# Patient Record
Sex: MALE | Race: WHITE | NOT HISPANIC OR LATINO | Employment: OTHER | URBAN - METROPOLITAN AREA
[De-identification: names, ages, dates, MRNs, and addresses within clinical notes are randomized per-mention and may not be internally consistent; named-entity substitution may affect disease eponyms.]

---

## 2015-10-15 LAB — HM COLONOSCOPY: NORMAL

## 2017-01-31 ENCOUNTER — ALLSCRIPTS OFFICE VISIT (OUTPATIENT)
Dept: OTHER | Facility: OTHER | Age: 77
End: 2017-01-31

## 2017-02-13 ENCOUNTER — GENERIC CONVERSION - ENCOUNTER (OUTPATIENT)
Dept: OTHER | Facility: OTHER | Age: 77
End: 2017-02-13

## 2017-03-12 ENCOUNTER — APPOINTMENT (EMERGENCY)
Dept: CT IMAGING | Facility: HOSPITAL | Age: 77
End: 2017-03-12
Payer: COMMERCIAL

## 2017-03-12 ENCOUNTER — APPOINTMENT (EMERGENCY)
Dept: RADIOLOGY | Facility: HOSPITAL | Age: 77
End: 2017-03-12
Payer: COMMERCIAL

## 2017-03-12 ENCOUNTER — HOSPITAL ENCOUNTER (INPATIENT)
Facility: HOSPITAL | Age: 77
LOS: 2 days | Discharge: HOME | DRG: 086 | End: 2017-03-15
Attending: SURGERY | Admitting: SURGERY
Payer: MEDICARE

## 2017-03-12 ENCOUNTER — HOSPITAL ENCOUNTER (EMERGENCY)
Facility: HOSPITAL | Age: 77
End: 2017-03-12
Attending: EMERGENCY MEDICINE | Admitting: EMERGENCY MEDICINE
Payer: COMMERCIAL

## 2017-03-12 VITALS
SYSTOLIC BLOOD PRESSURE: 148 MMHG | BODY MASS INDEX: 26.34 KG/M2 | WEIGHT: 184 LBS | DIASTOLIC BLOOD PRESSURE: 76 MMHG | HEIGHT: 70 IN | TEMPERATURE: 96.6 F | OXYGEN SATURATION: 95 % | RESPIRATION RATE: 16 BRPM | HEART RATE: 78 BPM

## 2017-03-12 DIAGNOSIS — S09.90XA HEAD TRAUMA, INITIAL ENCOUNTER: Primary | ICD-10-CM

## 2017-03-12 DIAGNOSIS — I60.9 SUBARACHNOID HEMORRHAGE (HCC): ICD-10-CM

## 2017-03-12 DIAGNOSIS — S02.2XXA CLOSED FRACTURE OF NASAL BONE, INITIAL ENCOUNTER: ICD-10-CM
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
## 2017-03-12 DIAGNOSIS — H53.8 BLURRING OF VISION: ICD-10-CM

## 2017-03-12 DIAGNOSIS — S02.2XXA: ICD-10-CM

## 2017-03-12 DIAGNOSIS — I60.9 SUBARACHNOID HEMORRHAGE (HCC): Primary | ICD-10-CM

## 2017-03-12 LAB
ANION GAP SERPL CALCULATED.3IONS-SCNC: 13 MMOL/L (ref 4–13)
APTT PPP: 43 SECONDS (ref 24–36)
BASOPHILS # BLD AUTO: 0.02 THOUSANDS/ΜL (ref 0–0.1)
BASOPHILS NFR BLD AUTO: 0 % (ref 0–1)
BUN SERPL-MCNC: 16 MG/DL (ref 5–25)
CALCIUM SERPL-MCNC: 8.8 MG/DL (ref 8.3–10.1)
CHLORIDE SERPL-SCNC: 106 MMOL/L (ref 100–108)
CO2 SERPL-SCNC: 24 MMOL/L (ref 21–32)
CREAT SERPL-MCNC: 0.97 MG/DL (ref 0.6–1.3)
EOSINOPHIL # BLD AUTO: 0.21 THOUSAND/ΜL (ref 0–0.61)
EOSINOPHIL NFR BLD AUTO: 3 % (ref 0–6)
ERYTHROCYTE [DISTWIDTH] IN BLOOD BY AUTOMATED COUNT: 14.1 % (ref 11.6–15.1)
GFR SERPL CREATININE-BSD FRML MDRD: >60 ML/MIN/1.73SQ M
GLUCOSE SERPL-MCNC: 68 MG/DL (ref 65–140)
HCT VFR BLD AUTO: 42.9 % (ref 36.5–49.3)
HGB BLD-MCNC: 14.5 G/DL (ref 12–17)
INR PPP: 2.84 (ref 0.86–1.16)
LYMPHOCYTES # BLD AUTO: 1.12 THOUSANDS/ΜL (ref 0.6–4.47)
LYMPHOCYTES NFR BLD AUTO: 15 % (ref 14–44)
MCH RBC QN AUTO: 32.8 PG (ref 26.8–34.3)
MCHC RBC AUTO-ENTMCNC: 33.8 G/DL (ref 31.4–37.4)
MCV RBC AUTO: 97 FL (ref 82–98)
MONOCYTES # BLD AUTO: 0.73 THOUSAND/ΜL (ref 0.17–1.22)
MONOCYTES NFR BLD AUTO: 10 % (ref 4–12)
NEUTROPHILS # BLD AUTO: 5.19 THOUSANDS/ΜL (ref 1.85–7.62)
NEUTS SEG NFR BLD AUTO: 72 % (ref 43–75)
PLATELET # BLD AUTO: 198 THOUSANDS/UL (ref 149–390)
PMV BLD AUTO: 9.7 FL (ref 8.9–12.7)
POTASSIUM SERPL-SCNC: 3.8 MMOL/L (ref 3.5–5.3)
PROTHROMBIN TIME: 28.9 SECONDS (ref 12–14.3)
RBC # BLD AUTO: 4.42 MILLION/UL (ref 3.88–5.62)
SODIUM SERPL-SCNC: 143 MMOL/L (ref 136–145)
WBC # BLD AUTO: 7.27 THOUSAND/UL (ref 4.31–10.16)

## 2017-03-12 PROCEDURE — 99285 EMERGENCY DEPT VISIT HI MDM: CPT

## 2017-03-12 PROCEDURE — 70450 CT HEAD/BRAIN W/O DYE: CPT

## 2017-03-12 PROCEDURE — 85025 COMPLETE CBC W/AUTO DIFF WBC: CPT | Performed by: EMERGENCY MEDICINE

## 2017-03-12 PROCEDURE — 70486 CT MAXILLOFACIAL W/O DYE: CPT

## 2017-03-12 PROCEDURE — 80048 BASIC METABOLIC PNL TOTAL CA: CPT | Performed by: EMERGENCY MEDICINE

## 2017-03-12 PROCEDURE — 93005 ELECTROCARDIOGRAM TRACING: CPT

## 2017-03-12 PROCEDURE — 73080 X-RAY EXAM OF ELBOW: CPT

## 2017-03-12 PROCEDURE — 85610 PROTHROMBIN TIME: CPT | Performed by: PHYSICIAN ASSISTANT

## 2017-03-12 PROCEDURE — 96372 THER/PROPH/DIAG INJ SC/IM: CPT

## 2017-03-12 PROCEDURE — 85730 THROMBOPLASTIN TIME PARTIAL: CPT | Performed by: PHYSICIAN ASSISTANT

## 2017-03-12 PROCEDURE — 72125 CT NECK SPINE W/O DYE: CPT

## 2017-03-12 PROCEDURE — 36415 COLL VENOUS BLD VENIPUNCTURE: CPT | Performed by: PHYSICIAN ASSISTANT

## 2017-03-12 RX ORDER — METOPROLOL SUCCINATE 25 MG/1
25 TABLET, EXTENDED RELEASE ORAL DAILY
COMMUNITY
End: 2018-08-20

## 2017-03-12 RX ORDER — GLIMEPIRIDE 2 MG/1
TABLET ORAL
COMMUNITY
Start: 2011-08-31 | End: 2018-02-27

## 2017-03-12 RX ORDER — PHYTONADIONE 10 MG/ML
5 INJECTION, EMULSION INTRAMUSCULAR; INTRAVENOUS; SUBCUTANEOUS ONCE
Status: COMPLETED | OUTPATIENT
Start: 2017-03-12 | End: 2017-03-12

## 2017-03-12 RX ORDER — TADALAFIL 5 MG/1
TABLET ORAL
COMMUNITY
Start: 2015-02-02 | End: 2017-03-12

## 2017-03-12 RX ORDER — WARFARIN SODIUM 5 MG/1
TABLET ORAL
COMMUNITY
Start: 2012-11-19 | End: 2017-03-15 | Stop reason: HOSPADM

## 2017-03-12 RX ADMIN — PHYTONADIONE 5 MG: 10 INJECTION, EMULSION INTRAMUSCULAR; INTRAVENOUS; SUBCUTANEOUS at 22:57

## 2017-03-13 PROBLEM — S00.03XA HEMATOMA OF FRONTAL SCALP: Status: ACTIVE | Noted: 2017-03-13

## 2017-03-13 PROBLEM — H05.239 PERIORBITAL HEMATOMA: Status: ACTIVE | Noted: 2017-03-13

## 2017-03-13 PROBLEM — I48.91 A-FIB (HCC): Chronic | Status: ACTIVE | Noted: 2017-03-13

## 2017-03-13 PROBLEM — S02.2XXA CLOSED NONDISPLACED FRACTURE OF NASAL BONE: Status: ACTIVE | Noted: 2017-03-13

## 2017-03-13 PROBLEM — I60.9 SUBARACHNOID HEMORRHAGE (HCC): Status: ACTIVE | Noted: 2017-03-13

## 2017-03-13 PROBLEM — S50.312A ABRASION OF ELBOW, LEFT: Status: ACTIVE | Noted: 2017-03-13

## 2017-03-13 LAB
ANION GAP SERPL CALCULATED.3IONS-SCNC: 11 MMOL/L (ref 4–13)
APTT PPP: 31 SECONDS (ref 24–36)
ATRIAL RATE: 51 BPM
BUN SERPL-MCNC: 17 MG/DL (ref 5–25)
CALCIUM SERPL-MCNC: 8.8 MG/DL (ref 8.3–10.1)
CHLORIDE SERPL-SCNC: 107 MMOL/L (ref 100–108)
CO2 SERPL-SCNC: 23 MMOL/L (ref 21–32)
CREAT SERPL-MCNC: 0.89 MG/DL (ref 0.6–1.3)
ERYTHROCYTE [DISTWIDTH] IN BLOOD BY AUTOMATED COUNT: 14.2 % (ref 11.6–15.1)
GFR SERPL CREATININE-BSD FRML MDRD: >60 ML/MIN/1.73SQ M
GLUCOSE SERPL-MCNC: 64 MG/DL (ref 65–140)
HCT VFR BLD AUTO: 42 % (ref 36.5–49.3)
HGB BLD-MCNC: 14.7 G/DL (ref 12–17)
INR PPP: 1.18 (ref 0.86–1.16)
MCH RBC QN AUTO: 34.3 PG (ref 26.8–34.3)
MCHC RBC AUTO-ENTMCNC: 35 G/DL (ref 31.4–37.4)
MCV RBC AUTO: 98 FL (ref 82–98)
P AXIS: 93 DEGREES
PLATELET # BLD AUTO: 182 THOUSANDS/UL (ref 149–390)
PMV BLD AUTO: 10 FL (ref 8.9–12.7)
POTASSIUM SERPL-SCNC: 4.1 MMOL/L (ref 3.5–5.3)
PROTHROMBIN TIME: 15.1 SECONDS (ref 12–14.3)
QRS AXIS: -65 DEGREES
QRSD INTERVAL: 146 MS
QT INTERVAL: 404 MS
QTC INTERVAL: 513 MS
RBC # BLD AUTO: 4.29 MILLION/UL (ref 3.88–5.62)
SODIUM SERPL-SCNC: 141 MMOL/L (ref 136–145)
T WAVE AXIS: 102 DEGREES
VENTRICULAR RATE: 97 BPM
WBC # BLD AUTO: 8.18 THOUSAND/UL (ref 4.31–10.16)

## 2017-03-13 PROCEDURE — 80048 BASIC METABOLIC PNL TOTAL CA: CPT | Performed by: EMERGENCY MEDICINE

## 2017-03-13 PROCEDURE — 85730 THROMBOPLASTIN TIME PARTIAL: CPT | Performed by: SURGERY

## 2017-03-13 PROCEDURE — 36415 COLL VENOUS BLD VENIPUNCTURE: CPT | Performed by: EMERGENCY MEDICINE

## 2017-03-13 PROCEDURE — 90471 IMMUNIZATION ADMIN: CPT

## 2017-03-13 PROCEDURE — C9132 KCENTRA, PER I.U.: HCPCS | Performed by: EMERGENCY MEDICINE

## 2017-03-13 PROCEDURE — 99285 EMERGENCY DEPT VISIT HI MDM: CPT

## 2017-03-13 PROCEDURE — 85027 COMPLETE CBC AUTOMATED: CPT | Performed by: EMERGENCY MEDICINE

## 2017-03-13 PROCEDURE — 85610 PROTHROMBIN TIME: CPT | Performed by: SURGERY

## 2017-03-13 PROCEDURE — 90715 TDAP VACCINE 7 YRS/> IM: CPT | Performed by: EMERGENCY MEDICINE

## 2017-03-13 RX ORDER — DOCUSATE SODIUM 100 MG/1
100 CAPSULE, LIQUID FILLED ORAL 2 TIMES DAILY PRN
Status: DISCONTINUED | OUTPATIENT
Start: 2017-03-13 | End: 2017-03-15 | Stop reason: HOSPADM

## 2017-03-13 RX ORDER — GLIMEPIRIDE 2 MG/1
2 TABLET ORAL
Status: DISCONTINUED | OUTPATIENT
Start: 2017-03-13 | End: 2017-03-15 | Stop reason: HOSPADM

## 2017-03-13 RX ORDER — LANOLIN ALCOHOL/MO/W.PET/CERES
3 CREAM (GRAM) TOPICAL
Status: DISCONTINUED | OUTPATIENT
Start: 2017-03-13 | End: 2017-03-15 | Stop reason: HOSPADM

## 2017-03-13 RX ORDER — BRIMONIDINE TARTRATE 0.15 %
1 DROPS OPHTHALMIC (EYE) EVERY 8 HOURS SCHEDULED
Status: DISCONTINUED | OUTPATIENT
Start: 2017-03-13 | End: 2017-03-15 | Stop reason: HOSPADM

## 2017-03-13 RX ORDER — ONDANSETRON 2 MG/ML
4 INJECTION INTRAMUSCULAR; INTRAVENOUS EVERY 6 HOURS PRN
Status: DISCONTINUED | OUTPATIENT
Start: 2017-03-13 | End: 2017-03-15 | Stop reason: HOSPADM

## 2017-03-13 RX ORDER — METOPROLOL SUCCINATE 25 MG/1
25 TABLET, EXTENDED RELEASE ORAL DAILY
Status: DISCONTINUED | OUTPATIENT
Start: 2017-03-13 | End: 2017-03-15 | Stop reason: HOSPADM

## 2017-03-13 RX ORDER — ECHINACEA PURPUREA EXTRACT 125 MG
1 TABLET ORAL AS NEEDED
Status: DISCONTINUED | OUTPATIENT
Start: 2017-03-13 | End: 2017-03-15 | Stop reason: HOSPADM

## 2017-03-13 RX ORDER — SODIUM CHLORIDE 9 MG/ML
75 INJECTION, SOLUTION INTRAVENOUS CONTINUOUS
Status: DISCONTINUED | OUTPATIENT
Start: 2017-03-13 | End: 2017-03-14

## 2017-03-13 RX ORDER — PROPARACAINE HYDROCHLORIDE 5 MG/ML
1 SOLUTION/ DROPS OPHTHALMIC ONCE
Status: COMPLETED | OUTPATIENT
Start: 2017-03-13 | End: 2017-03-13

## 2017-03-13 RX ORDER — LORAZEPAM 2 MG/ML
0.5 INJECTION INTRAMUSCULAR ONCE
Status: COMPLETED | OUTPATIENT
Start: 2017-03-13 | End: 2017-03-13

## 2017-03-13 RX ORDER — TIMOLOL MALEATE 5 MG/ML
1 SOLUTION/ DROPS OPHTHALMIC EVERY 12 HOURS SCHEDULED
Status: DISCONTINUED | OUTPATIENT
Start: 2017-03-13 | End: 2017-03-15 | Stop reason: HOSPADM

## 2017-03-13 RX ADMIN — PROTHROMBIN, COAGULATION FACTOR VII HUMAN, COAGULATION FACTOR IX HUMAN, COAGULATION FACTOR X HUMAN, PROTEIN C, PROTEIN S HUMAN, AND WATER 2200 UNITS: KIT at 03:03

## 2017-03-13 RX ADMIN — BIMATOPROST 1 DROP: 0.1 SOLUTION/ DROPS OPHTHALMIC at 23:07

## 2017-03-13 RX ADMIN — MELATONIN TAB 3 MG 3 MG: 3 TAB at 23:07

## 2017-03-13 RX ADMIN — TIMOLOL MALEATE 1 DROP: 5 SOLUTION/ DROPS OPHTHALMIC at 23:05

## 2017-03-13 RX ADMIN — FLUORESCEIN SODIUM 1 STRIP: 1 STRIP OPHTHALMIC at 00:50

## 2017-03-13 RX ADMIN — BRIMONIDINE TARTRATE 1 DROP: 1.5 SOLUTION OPHTHALMIC at 23:06

## 2017-03-13 RX ADMIN — GLIMEPIRIDE 2 MG: 2 TABLET ORAL at 11:18

## 2017-03-13 RX ADMIN — METOPROLOL SUCCINATE 25 MG: 25 TABLET, EXTENDED RELEASE ORAL at 09:00

## 2017-03-13 RX ADMIN — PHYTONADIONE 5 MG: 10 INJECTION, EMULSION INTRAMUSCULAR; INTRAVENOUS; SUBCUTANEOUS at 01:55

## 2017-03-13 RX ADMIN — TETANUS TOXOID, REDUCED DIPHTHERIA TOXOID AND ACELLULAR PERTUSSIS VACCINE, ADSORBED 0.5 ML: 5; 2.5; 8; 8; 2.5 SUSPENSION INTRAMUSCULAR at 02:50

## 2017-03-13 RX ADMIN — LORAZEPAM 0.5 MG: 2 INJECTION INTRAMUSCULAR; INTRAVENOUS at 02:49

## 2017-03-13 RX ADMIN — BRIMONIDINE TARTRATE 1 DROP: 1.5 SOLUTION OPHTHALMIC at 14:19

## 2017-03-13 RX ADMIN — SODIUM CHLORIDE 75 ML/HR: 0.9 INJECTION, SOLUTION INTRAVENOUS at 04:15

## 2017-03-13 RX ADMIN — TIMOLOL MALEATE 1 DROP: 5 SOLUTION/ DROPS OPHTHALMIC at 14:19

## 2017-03-13 RX ADMIN — Medication 1 SPRAY: at 23:07

## 2017-03-13 RX ADMIN — PROPARACAINE HYDROCHLORIDE 1 DROP: 5 SOLUTION/ DROPS OPHTHALMIC at 00:50

## 2017-03-14 LAB
APTT PPP: 31 SECONDS (ref 24–36)
GLUCOSE SERPL-MCNC: 324 MG/DL (ref 65–140)
INR PPP: 1.19 (ref 0.86–1.16)
PROTHROMBIN TIME: 15.2 SECONDS (ref 12–14.3)

## 2017-03-14 PROCEDURE — 97163 PT EVAL HIGH COMPLEX 45 MIN: CPT

## 2017-03-14 PROCEDURE — G8987 SELF CARE CURRENT STATUS: HCPCS

## 2017-03-14 PROCEDURE — G8989 SELF CARE D/C STATUS: HCPCS

## 2017-03-14 PROCEDURE — G8988 SELF CARE GOAL STATUS: HCPCS

## 2017-03-14 PROCEDURE — 97165 OT EVAL LOW COMPLEX 30 MIN: CPT

## 2017-03-14 PROCEDURE — 85730 THROMBOPLASTIN TIME PARTIAL: CPT | Performed by: EMERGENCY MEDICINE

## 2017-03-14 PROCEDURE — G8978 MOBILITY CURRENT STATUS: HCPCS

## 2017-03-14 PROCEDURE — 82948 REAGENT STRIP/BLOOD GLUCOSE: CPT

## 2017-03-14 PROCEDURE — 85610 PROTHROMBIN TIME: CPT | Performed by: EMERGENCY MEDICINE

## 2017-03-14 RX ORDER — LORAZEPAM 1 MG/1
1 TABLET ORAL EVERY 8 HOURS PRN
Status: DISCONTINUED | OUTPATIENT
Start: 2017-03-14 | End: 2017-03-15 | Stop reason: HOSPADM

## 2017-03-14 RX ORDER — LISINOPRIL 20 MG/1
20 TABLET ORAL DAILY
Status: DISCONTINUED | OUTPATIENT
Start: 2017-03-14 | End: 2017-03-15 | Stop reason: HOSPADM

## 2017-03-14 RX ORDER — BRINZOLAMIDE 10 MG/ML
1 SUSPENSION/ DROPS OPHTHALMIC 3 TIMES DAILY
Status: DISCONTINUED | OUTPATIENT
Start: 2017-03-14 | End: 2017-03-15 | Stop reason: HOSPADM

## 2017-03-14 RX ADMIN — BRINZOLAMIDE 1 DROP: 10 SUSPENSION/ DROPS OPHTHALMIC at 20:32

## 2017-03-14 RX ADMIN — BRINZOLAMIDE 1 DROP: 10 SUSPENSION/ DROPS OPHTHALMIC at 12:36

## 2017-03-14 RX ADMIN — TIMOLOL MALEATE 1 DROP: 5 SOLUTION/ DROPS OPHTHALMIC at 08:29

## 2017-03-14 RX ADMIN — SODIUM CHLORIDE 75 ML/HR: 0.9 INJECTION, SOLUTION INTRAVENOUS at 03:00

## 2017-03-14 RX ADMIN — MELATONIN TAB 3 MG 3 MG: 3 TAB at 21:54

## 2017-03-14 RX ADMIN — Medication 1 SPRAY: at 17:08

## 2017-03-14 RX ADMIN — METOPROLOL SUCCINATE 25 MG: 25 TABLET, EXTENDED RELEASE ORAL at 08:29

## 2017-03-14 RX ADMIN — LISINOPRIL 20 MG: 20 TABLET ORAL at 12:36

## 2017-03-14 RX ADMIN — GLIMEPIRIDE 2 MG: 2 TABLET ORAL at 12:35

## 2017-03-14 RX ADMIN — TIMOLOL MALEATE 1 DROP: 5 SOLUTION/ DROPS OPHTHALMIC at 20:32

## 2017-03-14 RX ADMIN — BRIMONIDINE TARTRATE 1 DROP: 1.5 SOLUTION OPHTHALMIC at 06:12

## 2017-03-14 RX ADMIN — Medication 1 SPRAY: at 08:32

## 2017-03-14 RX ADMIN — BRIMONIDINE TARTRATE 1 DROP: 1.5 SOLUTION OPHTHALMIC at 14:02

## 2017-03-14 RX ADMIN — BRIMONIDINE TARTRATE 1 DROP: 1.5 SOLUTION OPHTHALMIC at 21:54

## 2017-03-14 RX ADMIN — BIMATOPROST 1 DROP: 0.1 SOLUTION/ DROPS OPHTHALMIC at 21:53

## 2017-03-14 RX ADMIN — LORAZEPAM 1 MG: 1 TABLET ORAL at 21:54

## 2017-03-14 RX ADMIN — BRINZOLAMIDE 1 DROP: 10 SUSPENSION/ DROPS OPHTHALMIC at 17:07

## 2017-03-15 VITALS
HEIGHT: 70 IN | DIASTOLIC BLOOD PRESSURE: 92 MMHG | SYSTOLIC BLOOD PRESSURE: 186 MMHG | RESPIRATION RATE: 20 BRPM | TEMPERATURE: 97.9 F | HEART RATE: 94 BPM | BODY MASS INDEX: 27.11 KG/M2 | OXYGEN SATURATION: 96 % | WEIGHT: 189.4 LBS

## 2017-03-15 LAB
INR PPP: 1.12 (ref 0.86–1.16)
PROTHROMBIN TIME: 14.5 SECONDS (ref 12–14.3)

## 2017-03-15 PROCEDURE — 85610 PROTHROMBIN TIME: CPT | Performed by: NURSE PRACTITIONER

## 2017-03-15 PROCEDURE — 97110 THERAPEUTIC EXERCISES: CPT

## 2017-03-15 PROCEDURE — 97116 GAIT TRAINING THERAPY: CPT

## 2017-03-15 RX ORDER — BRINZOLAMIDE 10 MG/ML
1 SUSPENSION/ DROPS OPHTHALMIC 3 TIMES DAILY
Qty: 10 ML | Refills: 0 | Status: SHIPPED | OUTPATIENT
Start: 2017-03-15 | End: 2018-10-18

## 2017-03-15 RX ORDER — TIMOLOL MALEATE 5 MG/ML
1 SOLUTION/ DROPS OPHTHALMIC EVERY 12 HOURS SCHEDULED
Qty: 10 ML | Refills: 0 | Status: SHIPPED | OUTPATIENT
Start: 2017-03-15 | End: 2017-03-23

## 2017-03-15 RX ORDER — LISINOPRIL 20 MG/1
20 TABLET ORAL DAILY
Qty: 30 TABLET | Refills: 0 | Status: SHIPPED | OUTPATIENT
Start: 2017-03-15 | End: 2017-04-14

## 2017-03-15 RX ORDER — BRIMONIDINE TARTRATE 0.15 %
1 DROPS OPHTHALMIC (EYE) EVERY 8 HOURS SCHEDULED
Qty: 5 ML | Refills: 0 | Status: SHIPPED | OUTPATIENT
Start: 2017-03-15 | End: 2018-02-27

## 2017-03-15 RX ADMIN — METOPROLOL SUCCINATE 25 MG: 25 TABLET, EXTENDED RELEASE ORAL at 08:14

## 2017-03-15 RX ADMIN — GLIMEPIRIDE 2 MG: 2 TABLET ORAL at 08:14

## 2017-03-15 RX ADMIN — LISINOPRIL 20 MG: 20 TABLET ORAL at 08:14

## 2017-03-15 RX ADMIN — BRINZOLAMIDE 1 DROP: 10 SUSPENSION/ DROPS OPHTHALMIC at 08:15

## 2017-03-15 RX ADMIN — BRIMONIDINE TARTRATE 1 DROP: 1.5 SOLUTION OPHTHALMIC at 06:09

## 2017-03-15 RX ADMIN — TIMOLOL MALEATE 1 DROP: 5 SOLUTION/ DROPS OPHTHALMIC at 08:15

## 2017-03-20 ENCOUNTER — GENERIC CONVERSION - ENCOUNTER (OUTPATIENT)
Dept: OTHER | Facility: OTHER | Age: 77
End: 2017-03-20

## 2017-03-27 ENCOUNTER — TRANSCRIBE ORDERS (OUTPATIENT)
Dept: ADMINISTRATIVE | Facility: HOSPITAL | Age: 77
End: 2017-03-27

## 2017-03-27 ENCOUNTER — ALLSCRIPTS OFFICE VISIT (OUTPATIENT)
Dept: OTHER | Facility: OTHER | Age: 77
End: 2017-03-27

## 2017-03-27 DIAGNOSIS — S06.4X9A CLOSED FRACTURE OF VAULT OF SKULL WITH SUBARACHNOID, SUBDURAL, AND EXTRADURAL HEMORRHAGE, BRIEF (LESS THAN ONE HOUR) LOSS OF CONSCIOUSNESS (HCC): Primary | ICD-10-CM

## 2017-03-27 DIAGNOSIS — S06.6X9A CLOSED FRACTURE OF VAULT OF SKULL WITH SUBARACHNOID, SUBDURAL, AND EXTRADURAL HEMORRHAGE, BRIEF (LESS THAN ONE HOUR) LOSS OF CONSCIOUSNESS (HCC): Primary | ICD-10-CM

## 2017-03-27 DIAGNOSIS — S06.6X9A TRAUMATIC SUBARACHNOID HEMORRHAGE WITH LOSS OF CONSCIOUSNESS (HCC): ICD-10-CM

## 2017-03-27 DIAGNOSIS — S02.0XXA CLOSED FRACTURE OF VAULT OF SKULL WITH SUBARACHNOID, SUBDURAL, AND EXTRADURAL HEMORRHAGE, BRIEF (LESS THAN ONE HOUR) LOSS OF CONSCIOUSNESS (HCC): Primary | ICD-10-CM

## 2017-03-27 DIAGNOSIS — S06.5X9A CLOSED FRACTURE OF VAULT OF SKULL WITH SUBARACHNOID, SUBDURAL, AND EXTRADURAL HEMORRHAGE, BRIEF (LESS THAN ONE HOUR) LOSS OF CONSCIOUSNESS (HCC): Primary | ICD-10-CM

## 2017-04-04 ENCOUNTER — GENERIC CONVERSION - ENCOUNTER (OUTPATIENT)
Dept: OTHER | Facility: OTHER | Age: 77
End: 2017-04-04

## 2017-04-04 ENCOUNTER — HOSPITAL ENCOUNTER (OUTPATIENT)
Dept: CT IMAGING | Facility: HOSPITAL | Age: 77
Discharge: HOME | End: 2017-04-04
Payer: COMMERCIAL

## 2017-04-04 DIAGNOSIS — S06.6X9A TRAUMATIC SUBARACHNOID HEMORRHAGE WITH LOSS OF CONSCIOUSNESS (HCC): ICD-10-CM

## 2017-04-04 PROCEDURE — 70450 CT HEAD/BRAIN W/O DYE: CPT

## 2017-04-10 ENCOUNTER — ALLSCRIPTS OFFICE VISIT (OUTPATIENT)
Dept: OTHER | Facility: OTHER | Age: 77
End: 2017-04-10

## 2017-04-12 ENCOUNTER — GENERIC CONVERSION - ENCOUNTER (OUTPATIENT)
Dept: OTHER | Facility: OTHER | Age: 77
End: 2017-04-12

## 2017-04-21 ENCOUNTER — GENERIC CONVERSION - ENCOUNTER (OUTPATIENT)
Dept: OTHER | Facility: OTHER | Age: 77
End: 2017-04-21

## 2017-04-25 ENCOUNTER — GENERIC CONVERSION - ENCOUNTER (OUTPATIENT)
Dept: OTHER | Facility: OTHER | Age: 77
End: 2017-04-25

## 2017-05-02 ENCOUNTER — ALLSCRIPTS OFFICE VISIT (OUTPATIENT)
Dept: OTHER | Facility: OTHER | Age: 77
End: 2017-05-02

## 2017-06-19 ENCOUNTER — ALLSCRIPTS OFFICE VISIT (OUTPATIENT)
Dept: OTHER | Facility: OTHER | Age: 77
End: 2017-06-19

## 2017-07-26 ENCOUNTER — GENERIC CONVERSION - ENCOUNTER (OUTPATIENT)
Dept: OTHER | Facility: OTHER | Age: 77
End: 2017-07-26

## 2017-08-20 ENCOUNTER — GENERIC CONVERSION - ENCOUNTER (OUTPATIENT)
Dept: OTHER | Facility: OTHER | Age: 77
End: 2017-08-20

## 2017-08-21 ENCOUNTER — HOSPITAL ENCOUNTER (OUTPATIENT)
Dept: RADIOLOGY | Facility: HOSPITAL | Age: 77
Discharge: HOME | End: 2017-08-21
Payer: COMMERCIAL

## 2017-08-21 ENCOUNTER — GENERIC CONVERSION - ENCOUNTER (OUTPATIENT)
Dept: OTHER | Facility: OTHER | Age: 77
End: 2017-08-21

## 2017-08-21 ENCOUNTER — ALLSCRIPTS OFFICE VISIT (OUTPATIENT)
Dept: OTHER | Facility: OTHER | Age: 77
End: 2017-08-21

## 2017-08-21 ENCOUNTER — TRANSCRIBE ORDERS (OUTPATIENT)
Dept: ADMINISTRATIVE | Facility: HOSPITAL | Age: 77
End: 2017-08-21

## 2017-08-21 DIAGNOSIS — R07.9 CHEST PAIN: ICD-10-CM

## 2017-08-21 DIAGNOSIS — R53.83 OTHER FATIGUE: ICD-10-CM

## 2017-08-21 DIAGNOSIS — R06.02 SHORTNESS OF BREATH: ICD-10-CM

## 2017-08-21 PROCEDURE — 71020 HB CHEST X-RAY 2VW FRONTAL&LATL: CPT

## 2017-08-22 ENCOUNTER — GENERIC CONVERSION - ENCOUNTER (OUTPATIENT)
Dept: OTHER | Facility: OTHER | Age: 77
End: 2017-08-22

## 2017-08-23 ENCOUNTER — GENERIC CONVERSION - ENCOUNTER (OUTPATIENT)
Dept: OTHER | Facility: OTHER | Age: 77
End: 2017-08-23

## 2017-09-01 ENCOUNTER — TRANSCRIBE ORDERS (OUTPATIENT)
Dept: ADMINISTRATIVE | Facility: HOSPITAL | Age: 77
End: 2017-09-01

## 2017-09-01 DIAGNOSIS — J44.9 CHRONIC OBSTRUCTIVE PULMONARY DISEASE, UNSPECIFIED COPD TYPE (HCC): Primary | ICD-10-CM

## 2017-09-01 DIAGNOSIS — R07.9 CHEST PAIN, UNSPECIFIED TYPE: ICD-10-CM

## 2017-09-08 ENCOUNTER — HOSPITAL ENCOUNTER (OUTPATIENT)
Dept: NON INVASIVE DIAGNOSTICS | Facility: CLINIC | Age: 77
Discharge: HOME | End: 2017-09-08
Payer: COMMERCIAL

## 2017-09-08 DIAGNOSIS — R07.9 CHEST PAIN, UNSPECIFIED TYPE: ICD-10-CM

## 2017-09-08 DIAGNOSIS — J44.9 CHRONIC OBSTRUCTIVE PULMONARY DISEASE, UNSPECIFIED COPD TYPE (HCC): ICD-10-CM

## 2017-09-08 PROCEDURE — 93017 CV STRESS TEST TRACING ONLY: CPT

## 2017-09-08 PROCEDURE — 93005 ELECTROCARDIOGRAM TRACING: CPT

## 2017-09-08 PROCEDURE — A9502 TC99M TETROFOSMIN: HCPCS

## 2017-09-08 PROCEDURE — 78452 HT MUSCLE IMAGE SPECT MULT: CPT

## 2017-09-08 RX ADMIN — REGADENOSON 0.4 MG: 0.08 INJECTION, SOLUTION INTRAVENOUS at 09:00

## 2017-09-09 LAB
ATRIAL RATE: 208 BPM
ATRIAL RATE: 208 BPM
QRS AXIS: -62 DEGREES
QRS AXIS: -62 DEGREES
QRSD INTERVAL: 150 MS
QRSD INTERVAL: 150 MS
QT INTERVAL: 416 MS
QT INTERVAL: 416 MS
QTC INTERVAL: 486 MS
QTC INTERVAL: 486 MS
T WAVE AXIS: 112 DEGREES
T WAVE AXIS: 112 DEGREES
VENTRICULAR RATE: 82 BPM
VENTRICULAR RATE: 82 BPM

## 2017-09-11 LAB
MAX DIASTOLIC BP: 82 MMHG
MAX HEART RATE: 107 BPM
MAX PREDICTED HEART RATE: 143 BPM
MAX. SYSTOLIC BP: 166 MMHG
PROTOCOL NAME: NORMAL
REASON FOR TERMINATION: NORMAL
TARGET HR FORMULA: NORMAL
TEST INDICATION: NORMAL
TIME IN EXERCISE PHASE: 181 S

## 2017-09-13 ENCOUNTER — HOSPITAL ENCOUNTER (OUTPATIENT)
Dept: NON INVASIVE DIAGNOSTICS | Facility: CLINIC | Age: 77
Discharge: HOME | End: 2017-09-13
Payer: COMMERCIAL

## 2017-09-13 DIAGNOSIS — R07.9 CHEST PAIN, UNSPECIFIED TYPE: ICD-10-CM

## 2017-09-13 PROCEDURE — 93306 TTE W/DOPPLER COMPLETE: CPT

## 2017-09-15 ENCOUNTER — GENERIC CONVERSION - ENCOUNTER (OUTPATIENT)
Dept: OTHER | Facility: OTHER | Age: 77
End: 2017-09-15

## 2017-09-19 ENCOUNTER — GENERIC CONVERSION - ENCOUNTER (OUTPATIENT)
Dept: OTHER | Facility: OTHER | Age: 77
End: 2017-09-19

## 2017-09-25 ENCOUNTER — GENERIC CONVERSION - ENCOUNTER (OUTPATIENT)
Dept: OTHER | Facility: OTHER | Age: 77
End: 2017-09-25

## 2017-09-28 ENCOUNTER — GENERIC CONVERSION - ENCOUNTER (OUTPATIENT)
Dept: OTHER | Facility: OTHER | Age: 77
End: 2017-09-28

## 2017-10-25 ENCOUNTER — GENERIC CONVERSION - ENCOUNTER (OUTPATIENT)
Dept: FAMILY MEDICINE CLINIC | Facility: HOSPITAL | Age: 77
End: 2017-10-25

## 2017-11-21 ENCOUNTER — GENERIC CONVERSION - ENCOUNTER (OUTPATIENT)
Dept: OTHER | Facility: OTHER | Age: 77
End: 2017-11-21

## 2018-01-12 NOTE — PROGRESS NOTES
Assessment   1  Traumatic subarachnoid hemorrhage (852 00) (S06 6X9A)    Plan     · 1   1    · Follow Up After Tests Complete Evaluation and Treatment  Follow-up sovl with CT head;  when neurosurgeon in office  Status: Complete  Done: [id number]  Ordered; For: Traumatic subarachnoid hemorrhage;  Ordered By: Jose Peres    Performed:   Due: 01Apr2017; Last Updated By: Mayra Quarles; 3/27/2017   2:48:54 PM    * CT HEAD WO CONTRAST; Status:Need Information - Financial Authorization; Requested for:27Mar2017;   Perform:[facility] Radiology; Order Comments:follow up traumatic [address]; Due:27Mar2018; Last Updated By:Tova Schuster; 3/29/2017 2:37:08 PM;Ordered; For:Traumatic subarachnoid hemorrhage; Ordered By:Bubba Green Caro;       1 Amended By: Chata Vee; Mar 30 2017 5:46 PM EST    Discussion/Summary    This is a 68year old male who presents for clinical follow up for traumatic subarachnoid hemorrhage that resulted from a mechanical fall  Patient is doing well clinically without focal neurological findings  Patient inquiring when he may resume Coumadin  I explained to patient that I recommend he undergo CT head to evaluate if the subarachnoid hemorrhage has resolved prior to any consideration for patient potentially resuming Coumadin or any other blood thinning medication  Patient is advised undergo CT scan of head and follow-up in the office after completion of study to review result  For the time being patient advised to continue to remain off of any anticoagulation or other blood thinning medication  Patient advised to take all precautions and avoid activities that increase his chances of trauma to head  Fall precautions were discussed with patient  Patient advised to present to emergency room should he experience any worsening/severe headaches or other neurological change (ie  Mental status change, weakness, vision change from baseline, seizure, etc )      Mr Dieudonne Rhoades and Hope expressed understanding and agreement  The patient (and friend Viviane [facility]) was counseled regarding instructions for management, impressions  The patient has the current Goals: Resolution of hemorrhage  The patent has the current Barriers: History of being on anticoagulant (currently on hold)  Patient is able to Self-Care  The treatment plan was reviewed with the patient/guardian  The patient/guardian understands and agrees with the treatment plan      Chief Complaint  Patient presents for 2 week hospital f/u no studies  History of Present Illness  Mr Hay Pierce is a 68year old male who presents for hospital consult (3/13/17) follow up for traumatic subarachnoid hemorrhage s/p mechanical fall  He is accompanied with his friend Viviane [facility]   According to [address] [place] [address]  the patient had fallen because their feet got intertangled when kissing and he lost balance and fell backwards hitting the back of his head on cabinets  He was subsequently hospitalized at [facility] 3/12/17 - 3/15/17  His Coumadin (for hx of A fib) was held because of the [facility]  He was recommended clinical follow up in 2 weeks  Patient reports he is living at home with Viviane [facility]  Patient reports he remains off Coumadin and denies taking other blood thinning medication  Patient denies any fall or injury to his bed in the interval since hospital discharge  Patient denies headache, nausea, vomiting, seizure, or dizziness  He denies any memory or cognitive changes from his baseline prior to head trauma  Patient denies double or blurred vision  Patient reports his vision is at his baseline prior to his head trauma  Reports history of glaucoma and cataracts s/p surgery  He reports chronic pupil asymmetry  He denies numbness, tingling, or weakness  He ambulates independent  He denies any gait changes  He reports he is scheduled for a follow-up in the near future with his cardiologist (Dr Camila Oates in [facility])        Review of Systems    Constitutional: no fever, not feeling poorly, no recent weight gain, no chills, not feeling tired and no recent weight loss  Eyes: as noted in HPI, no eye pain, no dryness of the eyes, eyes not red, no purulent discharge from the eyes and no itching of the eyes  ENT: no earache, no nosebleeds, no sore throat, no hearing loss, no nasal discharge and no hoarseness  Cardiovascular: the heart rate was not slow, no chest pain, no intermittent leg claudication, the heart rate was not fast, no palpitations and no extremity edema  Respiratory: shortness of breath during exertion, but no shortness of breath, no cough and no wheezing  Gastrointestinal: no abdominal pain, no nausea, no constipation and no diarrhea  Genitourinary: no dysuria, no urinary hesitancy, no incontinence and no nocturia  Musculoskeletal: no joint swelling, no limb pain, no joint stiffness and no limb swelling  Integumentary: no rashes, no itching, no dry skin, no skin lesions and no skin wound  Neurological: no headache, no numbness, no tingling, no confusion, no dizziness, no limb weakness, no convulsions, no fainting and no difficulty walking  Psychiatric: no anxiety, no sleep disturbances and no depression  Hematologic/Lymphatic: no tendency for easy bleeding and no tendency for easy bruising  ROS reviewed  Active Problems   1  Abrasion of elbow, left (913 0) (S50 312A)  2  Anxiety (300 00) (F41 9)  3  Atrial fibrillation (427 31) (I48 91)  4  BPH with obstruction/lower urinary tract symptoms (600 01,599 69) (N40 1,N13 8)  5  Chronic gout, without tophus  6  Congestive heart failure (428 0) (I50 9)  7  Disc degeneration, lumbar (722 52) (M51 36)  8  DM neuropathy, type II diabetes mellitus (250 60,357 2) (E11 40)  9  Erectile dysfunction of non-organic origin (302 72) (F52 21)  10  Grief reaction (309 0) (F43 20)  11  Hematoma of frontal scalp (920) (S00 03XA)  12  Hyperlipidemia (272 4) (E78 5)  13  Hypertension (401 9) (I10)  14  Hyperthyroidism (242 90) (E05 90)  15  Nasal bone fracture (802 0) (S02  2XXA)  16  Paroxysmal supraventricular tachycardia (427 0) (I47 1)  17  Periorbital hematoma (376 32) (H05 239)  18  Pleural effusion (511 9) (J90)  19  Pneumonitis (486) (J18 9)  20  Presence of cardiac pacemaker (V45 01) (Z95 0)  21  Primary insomnia (307 42) (F51 01)  22  Secondary pulmonary hypertension (416 8) (I27 2)  23  Tinea pedis of both feet (110 4) (B35 3)  24  Traumatic subarachnoid hemorrhage (852 00) (S06 6X9A)  25  Tricuspid regurgitation (397 0) (I07 1)    Past Medical History   1  History of Abdominal pain, RUQ (789 01) (R10 11)  2  History of Abnormal liver enzymes (790 5) (R74 8)  3  History of Acute upper respiratory infection (465 9) (J06 9)  4  History of Bronchospasm (519 11) (J98 01)  5  History of Cellulitis of right lower leg (682 6) (L03 115)  6  History of Chronic Spontaneous Pneumothorax (512 83)  7  History of Community acquired pneumonia (5) (J18 9)  8  History of Difficulty breathing (786 09) (R06 89)  9  History of acute bronchitis (V12 69) (Z87 09)  10  History of fever (V13 89) (Z87 898)  11  History of glaucoma (V12 49) (Z86 69)  12  History of voice disturbance (V13 89) (Z87 898)  13  History of wheezing (V12 69) (Z87 898)  14  History of Leg pain, bilateral (729 5) (M79 604,M79 605)  15  History of Paroxysmal atrial fibrillation (427 31) (I48 0)  16  Pleural effusion (511 9) (J90)  17  History of Pneumonia (V12 61)  18  History of Primary open angle glaucoma (365 11,365 70) (H40 1190)  19  History of Skin Rash Generalized  20  History of UTI (urinary tract infection), bacterial (599 0,041 9) (N39 0,A49  9)    The active problems and past medical history were reviewed and updated today  Surgical History   1  History of Cholecystectomy Laparoscopic  2  History of Complete Colonoscopy  3  History of Partial Pulmonary Decortication    The surgical history was reviewed and updated today         Family History  Mother   1  Family history of   2  Family history of liver cancer (V16 0) (Z80 0)  Father   3  Family history of   4  Family history of dementia (V17 2) (Z81 8)  Daughter   11  Family history of Coagulation factor disorder  Son   6  Family history of Diabetes Mellitus (V18 0)    The family history was reviewed and updated today  Social History    · Alcohol Use (History)   · Daily alcohol use   · Denied: Drug Use (305 90)   · Former smoker (Z58 64) (T90 042)   · Has 5 children   · High school or GED   · Non-smoker (V49 89) (Z78 9)   · Sexually active   ·    · Working Part-time  The social history was reviewed and updated today  Current Meds  1  Beryl Contour Next Test In [employer]; TEST TWICE DAILY; Therapy: 75LUR2660 to (Donel Lipa)  Requested for: 26QHP7389; Last   Rx:2014 Ordered  2  Brimonidine Tartrate 0 15 % Ophthalmic Solution; INSTILL 1 DROP INTO AFFECTED   EYE(S) 3 TIMES DAILY; Therapy: 14CJH9774 to Recorded  3  Cosopt 22 3-6 8 MG/ML Ophthalmic Solution; Therapy: (0676 543 19 15) to Recorded  4  Econazole Nitrate 1 % External Cream; APPLY AND GENTLY MASSAGE INTO AFFECTED   AREA(S) TWICE DAILY; Therapy: 61HIN5576 to (Last Rx:2017)  Requested for: 96MDN2279 Ordered  5  Glimepiride 2 MG Oral Tablet; Take 1 tablets twice a day; Therapy: 96Ifc7810 to (Evaluate:2018)  Requested for: 59VUH9308; Last   Rx:2017 Ordered  6  Lancets Miscellaneous; TEST TWICE A DAY AND AS NEEDED; Therapy: 01AFD4829 to (Donel Lipa)  Requested for: 27UKA8293; Last   Rx:2014 Ordered  7  Lisinopril 20 MG Oral Tablet; TAKE 1 TABLET DAILY; Therapy: 93TZV2198 to (Evaluate:2013); Last Rx:12Ulc4215 Ordered  8  LORazepam 2 MG Oral Tablet; take 1 tablet every 6 hours as needed; Therapy: 16ZPX4309 to (Evaluate:95Xju3377)  Requested for: 2016; Last   Rx:2016 Ordered  9  Lumigan 0 01 % Ophthalmic Solution;    Therapy: 61CVE1452 to (Last CN:66VEW0086)  Requested for: 26NXD8623 Ordered  10  Metoprolol Succinate ER 25 MG Oral Tablet Extended Release 24 Hour; Take 1 tablet    twice daily; Therapy: 94Kzv6547 to (Evaluate:27Jan2014)  Requested for: 09WPL5782 Recorded  11  Temazepam 30 MG Oral Capsule; TAKE 1 BY MOUTH AT BEDTIME; Therapy: 94Wzb2117 to (Evaluate:42Gif3266)  Requested for: 79MRC6620; Last    Rx:31Jan2017 Ordered  12  Travatan Z 0 004 % Ophthalmic Solution; 1 drop in both eyes one time daily; Therapy: 88QVV0489 to Recorded  13  Viagra 100 MG Oral Tablet; TAKE 1 TABLET DAILY 1 HOUR BEFORE NEEDED; Therapy: 67XWU1870 to (Evaluate:18May2017); Last Rx:60Vuk3300 Ordered    The medication list was reviewed and updated today  Allergies   1  Penicillins  2  Lexapro TABS  3  Zoloft TABS    Vitals  Vital Signs    Recorded: 13YDZ1754 11:14AM   Temperature 98 2 F, Tympanic   Heart Rate 77   Respiration 16   Systolic 317, LUE, Sitting   Diastolic 82, LUE, Sitting   Height 5 ft 10 in   Weight 187 lb    BMI Calculated 26 83   BSA Calculated 2 03     Physical Exam     Constitutional Patient appears healthy and well developed  No signs of acute distress present  Respiratory Respiratory effort: Normal    Neurologic - Mental Status: Alert and Oriented x3  Mood and affect: Affect is normal   Speech is articulated and fluent  Grossly nonfocal   Judgment and insight: Normal     Cranial Nerve Exam:  Left pupil slightly larger then right pupil (chronic per patient)  s/p b/l cataract surgery  Right ~3mm left ~3 5mm  3rd, 4th, and 6th cranial nerves: Normal with no deficit  5th CN: Sensation to LT intact b/l V11-V3  Masseter intact b/l  7th cranial nerve: Face symmetrical at grimace and at rest  8th cranial nerve: Grossly intact to finger rub bilaterally  9th and 10th cranial nerves: Uvula is midline  11th cranial nerve: Shoulder shrug equal bilaterally  12th cranial nerve: Tongue mideline, no atrophy present    Motor System - Upper Extremities: Normal to inspection and palpation  Strength: Deltoids 5/5 bilaterally  Biceps 5/5 bilaterally  Triceps 5/5 bilaterally  Extensor carpi radials is 5/5 bilaterally  Extensor digitorum 5/5 bilaterally  Intrinsic 5/5 bilaterally   5/5 bilaterally  Motor System - Lower Extremities: Normal to inspection and palpation  Strength: iliopsoas 5/5 bilaterally  Quadriceps 5/5 bilaterally  Hamstrings 5/5 bilaterally  Gastrocnemius 5/5 bilaterally  Coordination: No pronator drift  Finger to nose intact bilaterally  Sensory: Sensation grossly intact to light touch  Sensation grossly intact to light touch  Gait and Station: [facility] with a normal gait  Independent  Health Management  History of Encounter for screening colonoscopy   COLONOSCOPY; every 10 years; Last 15AFI5738; Next Due: 71XTW6229;  Active    Future Appointments    Date/Time Provider Specialty Site   04/10/2017 11:15 AM Lien Arceo [facility] Neurosurgery [facility]   05/02/2017 01:00 PM Yani Liang  [facility] MD     Signatures   Electronically signed by : Helen Livingston [facility]; Mar 28 2017  6:59AM EST                       (Author)    Electronically signed by : Tulio Barrios MD PhD; Mar 30 2017  5:47PM EST                       (Review)

## 2018-01-13 VITALS
WEIGHT: 187 LBS | BODY MASS INDEX: 26.77 KG/M2 | RESPIRATION RATE: 16 BRPM | TEMPERATURE: 98.2 F | SYSTOLIC BLOOD PRESSURE: 110 MMHG | DIASTOLIC BLOOD PRESSURE: 82 MMHG | HEIGHT: 70 IN | HEART RATE: 77 BPM

## 2018-01-13 NOTE — PROGRESS NOTES
Assessment    1  Encounter for preventive health examination (V70 0) (Z00 00)   2  Atrial fibrillation (427 31) (I48 91)   3  DM neuropathy, type II diabetes mellitus (250 60,357 2) (E11 40)   4  Hypertension (401 9) (I10)   · elevated today, multiple med intolerances/SEs   5  Hyperlipidemia (272 4) (E78 5)   6  Hyperthyroidism (242 90) (E05 90)    Plan  DM neuropathy, type II diabetes mellitus    · Glimepiride 2 MG Oral Tablet; Take 1 tablets twice a day   · (1) CBC/ PLT (NO DIFF); Status:Hold For - Exact Date; Requested for:Approx 74OKH3536;     · (1) COMPREHENSIVE METABOLIC PANEL; Status:Hold For - Exact Date; Requested  for:Approx 46DGP4602;    · (1) HEMOGLOBIN A1C; Status:Hold For - Exact Date; Requested for:Approx 44FFO8261;    · *VB - Foot Exam; Status:Complete;   Done: 94DLE9700 01:13PM   · Follow-up visit in 3 months Evaluation and Treatment  Follow-up  Status: Hold For -  Scheduling  Requested for: 24ACK8547  Health Maintenance    · *VB - Fall Risk Assessment  (Dx Z13 89 Screen for Neurologic Disorder);  Status:Complete;   Done: 84KTO6565 01:13PM   · *VB - Urinary Incontinence Screen (Dx Z13 89 Screen for UI); Status:Complete;   Done:  25MLC9252 01:13PM   · Eat a low fat and low cholesterol diet ; Status:Complete;   Done: 42VZO3122   · Stretch and warm up your muscles during the first 10 minutes , then cool down your  muscles for the last 10 minutes of exercise ; Status:Complete;   Done: 04KVW3972   · The plan of care for urinary incontinence is detailed in the plan and/or discussion section  of today's note ; Status:Complete;   Done: 64WNL7045   · These are things you can do to prevent falls in and around the home ; Status:Complete;    Done: 51WDN0751   · We recommend that you create an advance directive ; Status:Complete;   Done:  51KAW7201  Hyperlipidemia    · (1) LIPID PANEL, FASTING; Status:Hold For - Exact Date;  Requested for:Approx  68BGW8619;   Hyperthyroidism    · (1) TSH WITH FT4 REFLEX; Status:Hold For - Exact Date; Requested for:Approx  65QMK0713;   Primary insomnia    · Temazepam 30 MG Oral Capsule; TAKE 1 BY MOUTH AT BEDTIME  Tinea pedis of both feet    · Econazole Nitrate 1 % External Cream; APPLY AND GENTLY MASSAGE INTO  AFFECTED AREA(S) TWICE DAILY    Discussion/Summary    Labs reviewed, excellent A1c at 5 3 but could do without Janumet  Offered PT for gait and balance- he wants to think about it and do more bike work and walking  Impression: Subsequent Annual Wellness Visit, with preventive exam as well as age and risk appropriate counseling completed  Cardiovascular screening and counseling: screening is current  Diabetes screening and counseling: screening is current  Colorectal cancer screening and counseling: screening is current  Prostate cancer screening and counseling: screening is current  Osteoporosis screening and counseling: screening not indicated  Abdominal aortic aneurysm screening and counseling: screening not indicated  Glaucoma screening and counseling: screening is current  HIV screening and counseling: screening not indicated  Immunizations: influenza vaccine is up to date this year, the lifetime pneumococcal vaccine has been completed, hepatitis B vaccination series is not indicated at this time due to the patient's low risk of karmen the disease, the risks and benefits of the Zostavax vaccine were discussed with the patient, Td vaccination up to date and Tdap vaccination up to date  Advice and education were given regarding fall risk reduction  He was referred to none  Medical Equipment/Suppliers: none  Patient Discussion: plan discussed with the patient, follow-up visit needed in 4 months  Patient is able to Self-Care  Chief Complaint  HM      Advance Directives  Advance Directive [facility]:   YES - Patient has an advance health care directive  The patient has a living will located  in patient's home  Capacity/Competence:  This patient has full decision making capacity for discussion of advance care planning and This patient has full decision making competency for discussion of advance care planning  History of Present Illness  HPI: Legs are bothering him in the way of weakness  Avoiding stairs  Some balance dysfunction  Good result with surgery on OS with Dr Elisabet Johansen  Meters are good all in low 100 range  Only on 1/4 of the Janumet tablet       Welcome to [employer] and Wellness Visits: The patient is being seen for the subsequent annual wellness visit  Medicare Screening and Risk Factors   Hospitalizations: he has been previously hospitalizied and he has been hospitalized 5 times  Once per lifetime medicare screening tests: ECG ~U() and AAA screening US has not yet been done  Medicare Screening Tests Risk Questions   Abdominal aortic aneurysm risk assessment: none indicated  Osteoporosis risk assessment: none indicated  HIV risk assessment: none indicated  Drug and Alcohol Use: The patient is a former cigarette smoker  The patient reports frequent alcohol use  Alcohol concern:  no personal concern about alcohol use and no family concern about alcohol use  He has never used illicit drugs  Diet and Physical Activity: Current diet includes well balanced meals  He exercises infrequently  Exercise: walking  Mood Disorder and Cognitive Impairment Screening:   Depression screening  negative for symptoms  He denies feeling down, depressed, or hopeless over the past two weeks  He denies feeling little interest or pleasure in doing things over the past two weeks  Cognitive impairment screening: denies difficulty learning/retaining new information, denies difficulty handling complex tasks, denies difficulty with reasoning, denies difficulty with spatial ability and orientation, denies difficulty with language and denies difficulty with behavior     Functional Ability/Level of Safety: Hearing is normal bilaterally and a hearing aid is not used  The patient is currently able to do activities of daily living without limitations, able to do instrumental activities of daily living without limitations, able to participate in social activities without limitations and able to drive without limitations  Activities of daily living details: does not need help using the phone, no transportation help needed, does not need help shopping, no meal preparation help needed, does not need help doing housework, does not need help doing laundry, does not need help managing medications and does not need help managing money  Fall risk factors:  alcohol use, antihypertensive use and optimize BP control, but no polypharmacy, no mobility impairment, no antidepressant use, no deconditioning, no postural hypotension, no sedative use, no visual impairment, no urinary incontinence, no cognitive impairment, up and go test was normal and no previous fall  Home safety risk factors:  no unfamiliar surroundings, no loose rugs, no poor household lighting, no uneven floors, no household clutter, grab bars in the bathroom and handrails on the stairs  Advance Directives: Advance directives: living will, durable power of  for health care directives and advance directives  end of life decisions were reviewed with the patient  Co-Managers and Medical Equipment/Suppliers: See Patient Care Team       Reviewed Updated [address] [place] [address] [address]:   Last Medicare Wellness Visit Information was reviewed, patient interviewed and updates made to the record today  Preventive Quality Program 65 and Older: Falls Risk: The patient fell 0 times in the past 12 months  The patient is currently asymptomatic Symptoms Include:  Associated symptoms:  No associated symptoms are reported no impaired mobility and no impaired ability to live independently  The patient currently has no urinary incontinence symptoms         Patient Care Team    Care Team Member Role Specialty Office Number   Xiao Juan Antonio ELLIS  Specialist Pulmonary Medicine [phone]   Lazara Vora MD  Thoracic Surgery [phone]   Dorothy Bahena MD  Family Medicine [phone] [facility] MD  Internal Medicine [phone]     Review of Systems    Constitutional: no malaise and no fatigue  ENT: no earache  Cardiovascular: no chest pain and no palpitations  Active Problems    1  Anxiety (300 00) (F41 9)   2  Atrial fibrillation (427 31) (I48 91)   3  BPH with obstruction/lower urinary tract symptoms (600 01,599 69) (N40 1,N13 8)   4  Chronic gout, without tophus   5  Congestive heart failure (428 0) (I50 9)   6  Disc degeneration, lumbar (722 52) (M51 36)   7  DM neuropathy, type II diabetes mellitus (250 60,357 2) (E11 40)   8  Erectile dysfunction of non-organic origin (302 72) (F52 21)   9  Grief reaction (309 0) (F43 20)   10  Hyperlipidemia (272 4) (E78 5)   11  Hypertension (401 9) (I10)   12  Hyperthyroidism (242 90) (E05 90)   13  Paroxysmal supraventricular tachycardia (427 0) (I47 1)   14  Pleural effusion (511 9) (J90)   15  Pneumonitis (486) (J18 9)   16  Primary insomnia (307 42) (F51 01)   17  Secondary pulmonary hypertension (416 8) (I27 2)   18   Tricuspid regurgitation (397 0) (I07 1)    Past Medical History    · History of Abdominal pain, RUQ (789 01) (R10 11)   · History of Abnormal liver enzymes (790 5) (R74 8)   · History of Acute upper respiratory infection (465 9) (J06 9)   · History of Bronchospasm (519 11) (J98 01)   · History of Cellulitis of right lower leg (682 6) (L03 115)   · History of Chronic Spontaneous Pneumothorax (512 83)   · History of Community acquired pneumonia (486) (J18 9)   · History of Difficulty breathing (786 09) (R06 89)   · History of acute bronchitis (V12 69) (Z87 09)   · History of fever (V13 89) (A36 679)   · History of glaucoma (V12 49) (Z86 69)   · History of voice disturbance (V13 89) (N23 487)   · History of wheezing (V12 69) (M68 037)   · History of Leg pain, bilateral (729 5) (M79 604,M79 605)   · History of Paroxysmal atrial fibrillation (427 31) (I48 0)   · Pleural effusion (511 9) (J90)   · History of Pneumonia (V12 61)   · History of Primary open angle glaucoma (365 11,365 70) (H40 1190)   · History of Skin Rash Generalized   · History of UTI (urinary tract infection), bacterial (599 0,041 9) (N39 0,A49  9)    The active problems and past medical history were reviewed and updated today  Surgical History    · History of Cholecystectomy Laparoscopic   · History of Complete Colonoscopy   · History of Partial Pulmonary Decortication    The surgical history was reviewed and updated today  Family History  Daughter    · Family history of Coagulation factor disorder  Son    · Family history of Diabetes Mellitus (V18 0)    The family history was reviewed and updated today  Social History    · Alcohol Use (History)   · Denied: Drug Use (305 90)   · Former smoker (V15 82) (V68 434)   · Non-smoker (V49 89) (Z78 9)   ·    · Working Part-time  The social history was reviewed and updated today  The social history was reviewed and is unchanged  Current Meds   1  Azithromycin 250 MG Oral Tablet; TAKE 2 TABLETS ON DAY 1 THEN TAKE 1 TABLET A   DAY FOR 4 DAYS; Therapy: 43BLT5110 to (Last Rx:19Ldx4551)  Requested for: 24NOS5320 Ordered   2  Beryl Contour Next Test In [employer]; TEST TWICE DAILY; Therapy: 98GRT6825 to (Wing Reed)  Requested for: 96GZC6085; Last   Rx:11Yfs7171 Ordered   3  Cialis 5 MG Oral Tablet; TAKE 1 TABLET DAILY AS DIRECTED; Therapy: 34MXC4968 to (Evaluate:02Jun2015)  Requested for: 39FDW1830; Last   Rx:99Yfo5327 Ordered   4  Cosopt 22 3-6 8 MG/ML Ophthalmic Solution; Therapy: (360 121 366) to Recorded   5  Coumadin 5 MG Oral Tablet; TAKE AS DIRECTED BY CARDIOLOGY;   Therapy: 68VFT6072 to (Evaluate:17Jun2013) Recorded   6  Furosemide 20 MG Oral Tablet; take 1 tablet by mouth once daily;    Therapy: 95HWR5883 to (Evaluate:11Jun2014)  Requested for: 84IIH4309; Last   Rx:77Vij6949 Ordered   7  Glimepiride 2 MG Oral Tablet; Take 1 tablets twice a day; Therapy: 28Ocu3772 to (Evaluate:23Apr2017)  Requested for: 28Apr2016; Last   Rx:32Wuc5352 Ordered   8  Lancets Miscellaneous; TEST TWICE A DAY AND AS NEEDED; Therapy: 18LVP4585 to (Randy Sears)  Requested for: 76XZX5604; Last   Rx:09Mhe8573 Ordered   9  Lisinopril 20 MG Oral Tablet; TAKE 1 TABLET DAILY; Therapy: 73OGQ0853 to (Evaluate:01Jun2013); Last Rx:35Ppw6494 Ordered   10  LORazepam 2 MG Oral Tablet; take 1 tablet every 6 hours as needed; Therapy: 15DTK4784 to (Evaluate:82Kqr8822)  Requested for: 26Oct2016; Last    Rx:30Doq3124 Ordered   11  Lumigan 0 01 % Ophthalmic Solution; Therapy: 17RPV8862 to (Last TB:37PDD5199)  Requested for: 73UUE8498 Ordered   12  Metoprolol Succinate ER 50 MG Oral Tablet Extended Release 24 Hour; TAKE 1 TABLET    TWICE A DAY; Therapy: 59Zrg3009 to (Evaluate:76Znz3383)  Requested for: 78KVV0369 Recorded   13  Temazepam 30 MG Oral Capsule; TAKE 1 BY MOUTH AT BEDTIME; Therapy: 57Rhc8327 to (Genoveva Weeks)  Requested for: 75SPW3214; Last    Rx:32Ssw2401 Ordered   14  Viagra 100 MG Oral Tablet; TAKE 1 TABLET DAILY 1 HOUR BEFORE NEEDED; Therapy: 97OKY4416 to (Evaluate:08Apr2016); Last Rx:19Jan2016 Ordered    The medication list was reviewed and updated today  Allergies    1  Penicillins   2  Lexapro TABS   3  Zoloft TABS    Immunizations   ** Printed in Appendix #1 below  Vitals  Signs    Temperature: 98 F, Tympanic  Heart Rate: 80, R Radial  Pulse Quality: Regular  Systolic: 553, LUE, Sitting  Diastolic: 70, LUE, Sitting  Height: 5 ft 10 in  Weight: 195 lb 6 4 oz  BMI Calculated: 28 04  BSA Calculated: 2 07    Physical Exam    Constitutional   General appearance: No acute distress, well appearing and well nourished      Ears, Nose, Mouth, and Throat   External inspection of ears and nose: Normal     Neck Neck: Supple, symmetric, trachea midline, no masses  Thyroid: Normal, no thyromegaly  Pulmonary   Respiratory effort: No increased work of breathing or signs of respiratory distress  Auscultation of lungs: Clear to auscultation  Cardiovascular   Auscultation of heart: Normal rate and rhythm, normal S1 and S2, no murmurs  Carotid pulses: 2+ bilaterally  Diabetic Foot Exam: Socks and shoes removed, Right Foot Findings: normal foot  The toes were normal  The sensory exam showed normal vibratory sensation at the level of the toes and normal position sense at the level of the toes  Socks and Shoes removed, Left Foot Findings: normal foot  The toes were normal  The sensory exam showed normal vibratory sensation at the level of the toes and normal position sense at the level of the toes  Monofilament Testing: normal tactile sensation with monofilament testing throughout both feet  Vascular: Capillary refills findings on the right were normal in the toes  Pulses: 1+ in the posterior tibialis and 1+ in the dorsalis pedis  Capillary refills findings on the left were normal in the toes  Pulses: 1+ in the posterior tibialis and 1+ in the dorsalis pedis  Assign Risk Category: 1: No loss of protective sensation, deformity present  Impending risk  Procedure    Procedure: Visual Acuity Test    Indication: routine screening  Inforrmation supplied by a Snellen chart  Results: 20/100 in the right eye with corrective device, 20/25 in the left eye with corrective device      Health Management  History of Encounter for screening colonoscopy   COLONOSCOPY; every 10 years; Last 86HFL8709; Next Due: 50GHR4759;  Active    Signatures   Electronically signed by : Sara Mccain MD; 2017  1:48PM EST                       (Author)    Appendix #1     Patient: Julianna Hicks; : 1940; MRN: [id number]      1 2 3 4 5    Influenza  11-Sep-2012 19-Sep-2013 08-Oct-2014 21-Oct-2015 26-Oct-2016    PCV 14-Jul-2015        PPSV  09-Dec-2004 18-Feb-2013       Tdap  08-Oct-2014

## 2018-01-13 NOTE — MISCELLANEOUS
Message   Recorded as Task   Date: 12/22/2016 01:18 PM, Created By: Xavi Pruitt   Task Name: Medical Complaint Callback   Assigned To: [address] [place] [address]   Regarding Patient: Tashia Clayton, Status: Active   Comment:    Shiela Lance - 22 Dec 2016 1:18 PM     TASK CREATED  Caller: Self; [phone] (Home)  Toshia Cohcran left a message on the  that the meds Dr Danny Roper worked a little bit  He still has drainage and head congestion  Is there anything else he can take? Please advise  Sindhu Estrada - 22 Dec 2016 1:31 PM     TASK REASSIGNED: Previously Assigned To [address] [place] [address]  Please advise  Mica Ibarra - 22 Dec 2016 3:23 PM     TASK REPLIED TO: Previously Assigned To Trjuan Pollen                 can use a saline nasal spray and antihistamine - Zyrtec or Claritin and plenty of rest and fluids - can take 10-14 days to resolve   Isabel Herbert - 22 Dec 2016 3:55 PM     TASK EDITED  Patient notified        Active Problems    1  Acute reaction to stress (308 9) (F43 0)   2  Anxiety (300 00) (F41 9)   3  Atrial fibrillation (427 31) (I48 91)   4  BPH with obstruction/lower urinary tract symptoms (600 01,599 69) (N40 1,N13 8)   5  Calculus of gallbladder w/o mention of cholecystitis or obstruction (574 20) (K80 20)   6  Cataract, left (366 9) (H26 9)   7  Chronic gout, without tophus   8  Chronic kidney disease (CKD), stage II (mild) (585 2) (N18 2)   9  Chronic Spontaneous Pneumothorax (512 83)   10  Colonoscopy (Fiberoptic)   11  Congestive heart failure (428 0) (I50 9)   12  Cough (786 2) (R05)   13  Diarrhea (787 91) (R19 7)   14  Disc degeneration, lumbar (722 52) (M51 36)   15  DM neuropathy, type II diabetes mellitus (250 60,357 2) (E11 40)   16  Encounter for screening colonoscopy (V76 51) (Z12 11)   17  Erectile dysfunction of non-organic origin (302 72) (F52 21)   18  Fatigue (780 79) (R53 83)   19  Flu vaccine need (V04 81) (Z23)   20   Foul smelling urine (791 9) (R82 90) 21  Grief reaction (309 0) (F43 20)   22  Hyperlipidemia (272 4) (E78 5)   23  Hypertension (401 9) (I10)   24  Hyperthyroidism (242 90) (E05 90)   25  Muscle weakness (generalized) (728 87) (M62 81)   26  Need for diphtheria-tetanus-pertussis (Tdap) vaccine (V06 1) (Z23)   27  Need for vaccination with 13-polyvalent pneumococcal conjugate vaccine (V03 82) (Z23)   28  Nonspecific abnormal findings on radiological and examination of lung field (793 19)    (R91 8)   29  Paroxysmal supraventricular tachycardia (427 0) (I47 1)   30  Pleural effusion (511 9) (J90)   31  Pneumonitis (486) (J18 9)   32  Primary insomnia (307 42) (F51 01)   33  Screening for colon cancer (V76 51) (Z12 11)   34  Secondary pulmonary hypertension (416 8) (I27 2)   35  Tendinitis (726 90) (M77 9)   36  Tricuspid regurgitation (397 0) (I07 1)    Current Meds   1  Azithromycin 250 MG Oral Tablet; TAKE 2 TABLETS ON DAY 1 THEN TAKE 1 TABLET A   DAY FOR 4 DAYS; Therapy: 09OVP8827 to (Last Rx:91Kyz3648)  Requested for: 04EJV1154 Ordered   2  Beryl Contour Next Test In [employer]; TEST TWICE DAILY; Therapy: 86DPG4335 to (Hay Byrd)  Requested for: 89NMG5826; Last   Rx:02Oct2014 Ordered   3  Benzonatate 100 MG Oral Capsule (Tessalon Perles); TAKE ONE TO TWO CAPSULES   BY MOUTH EVERY 8 HOURS AS NEEDED FOR COUGH; Therapy: 76ZQG5530 to (Complete:69Mvb2418)  Requested for: 46BRM3815; Last   Rx:93Itq1043 Ordered   4  Cialis 5 MG Oral Tablet; TAKE 1 TABLET DAILY AS DIRECTED; Therapy: 26OZK2410 to (Evaluate:02Jun2015)  Requested for: 63DIV0300; Last   Rx:06Fbt2857 Ordered   5  Cosopt 22 3-6 8 MG/ML Ophthalmic Solution (Dorzolamide HCl-Timolol Mal); Therapy: ([phone] 15) to Recorded   6  Coumadin 5 MG Oral Tablet (Warfarin Sodium); TAKE AS DIRECTED BY CARDIOLOGY;   Therapy: 94NGF5567 to (Evaluate:17Jun2013) Recorded   7  Furosemide 20 MG Oral Tablet; take 1 tablet by mouth once daily;    Therapy: 84UBN1859 to (Evaluate:11Jun2014) Requested for: 73SZW7833; Last   Rx:70Oiv5996 Ordered   8  Glimepiride 2 MG Oral Tablet; Take 1 tablets twice a day; Therapy: 83Gxr6431 to (Evaluate:23Apr2017)  Requested for: 28Apr2016; Last   Rx:91Fyi1784 Ordered   9  Janumet  MG Oral Tablet; Take 1 tablet daily as directed; Therapy: 04Ziq1126 to (Evaluate:17Mar2017)  Requested for: 22Mar2016; Last   Rx:22Mar2016 Ordered   10  Lancets Miscellaneous; TEST TWICE A DAY AND AS NEEDED; Therapy: 25VTC3460 to (Montserrat Garcia)  Requested for: 26HRS0134; Last    Rx:07Pzf0980 Ordered   11  Lisinopril 20 MG Oral Tablet; TAKE 1 TABLET DAILY; Therapy: 78QEZ1966 to (Evaluate:01Jun2013); Last Rx:81Kab2521 Ordered   12  LORazepam 2 MG Oral Tablet; take 1 tablet every 6 hours as needed; Therapy: 26XLL0110 to (Evaluate:73Eed8873)  Requested for: 26Oct2016; Last    Rx:26Oct2016 Ordered   13  Lumigan 0 01 % Ophthalmic Solution; Therapy: 79CEV4561 to (Last XL:04FFO2369)  Requested for: 71ZTJ7552 Ordered   14  Metoprolol Succinate ER 50 MG Oral Tablet Extended Release 24 Hour; TAKE 1 TABLET    TWICE A DAY; Therapy: 16Afl3712 to (Evaluate:73Wby0510)  Requested for: 90LYN8001 Recorded   15  Temazepam 30 MG Oral Capsule; TAKE 1 BY MOUTH AT BEDTIME; Therapy: 16Zsn9002 to (Melanie Snow)  Requested for: 16TLC9031; Last    Rx:24Hte6117 Ordered   16  Viagra 100 MG Oral Tablet; TAKE 1 TABLET DAILY 1 HOUR BEFORE NEEDED; Therapy: 51PVU3723 to (Evaluate:08Apr2016); Last Rx:19Jan2016 Ordered    Allergies    1  Penicillins   2   Lexapro TABS   3  Zoloft TABS    Signatures   Electronically signed by : Yaya Luis, ; Dec 22 2016  3:56PM EST                       (Author)

## 2018-01-13 NOTE — MISCELLANEOUS
Message   Recorded as Task   Date: 02/29/2016 09:57 AM, Created By: Maci Zimmerman   Task Name: Medical Complaint Callback   Assigned To: [facility],Team   Regarding Patient: Devika Paz, Status: Active   Comment:    Maci Zimmerman - 29 Feb 2016 9:57 AM     TASK CREATED  Patient calling because he has a UTI  He stated that he has burning during urination  I offered the patient an appointment for today and tomorrow and he declined  He is requesting something to be called in to [facility]  Nathen Hilario is requesting a call back at [phone]  MaribellSharda - 29 Feb 2016 10:06 AM     TASK EDITED  Let me know if you will call a script in or does he need appt  Dior Hamm - 29 Feb 2016 11:15 AM     TASK EDITED  Patient returned call 10:22am , left message on voicemail for call back   Branden Bazzi - 29 Feb 2016 11:26 AM     TASK REPLIED TO: Previously Assigned To Richard Wilkes Cipro done for 5 days  He should hold off his Glimepiride while on the antibiotic   Maribell,Shardaann - 29 Feb 2016 11:29 AM     TASK EDITED  Patient aware        Active Problems    1  Anxiety (300 00) (F41 9)   2  Atrial fibrillation (427 31) (I48 91)   3  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)   4  Calculus of gallbladder w/o mention of cholecystitis or obstruction (574 20) (K80 20)   5  Chronic gout, without tophus   6  Chronic Spontaneous Pneumothorax (512 83)   7  Colonoscopy (Fiberoptic)   8  Congestive heart failure (428 0) (I50 9)   9  Diarrhea (787 91) (R19 7)   10  Disc degeneration, lumbar (722 52) (M51 36)   11  DM neuropathy, type II diabetes mellitus (250 60,357 2) (E11 40)   12  Encounter for screening colonoscopy (V76 51) (Z12 11)   13  Erectile dysfunction of non-organic origin (302 72) (F52 21)   14  Fatigue (780 79) (R53 83)   15  Flu vaccine need (V04 81) (Z23)   16  Hyperlipidemia (272 4) (E78 5)   17  Hypertension (401 9) (I10)   18   Hyperthyroidism (242 90) (E05 90)   19  Muscle weakness (generalized) (728 87) (M62 81)   20  Need for diphtheria-tetanus-pertussis (Tdap) vaccine (V06 1) (Z23)   21  Need for vaccination with 13-polyvalent pneumococcal conjugate vaccine (V03 82) (Z23)   22  Nonspecific abnormal findings on radiological and examination of lung field (793 19)    (R91 8)   23  Paroxysmal supraventricular tachycardia (427 0) (I47 1)   24  Pleural effusion (511 9) (J90)   25  Primary insomnia (307 42) (F51 01)   26  Screening for colon cancer (V76 51) (Z12 11)   27  Secondary pulmonary hypertension (416 8) (I27 2)   28  Tendinitis (726 90) (M77 9)   29  Tricuspid regurgitation (397 0) (I07 1)   30  UTI (urinary tract infection), bacterial (599 0,041 9) (N39 0,A49  9)    Current Meds   1  Beryl Contour Next Test In [employer]; TEST TWICE DAILY; Therapy: 18QFK8529 to (Mindy Pop)  Requested for: 16NNS2725; Last   Rx:02Oct2014 Ordered   2  Cialis 5 MG Oral Tablet; TAKE 1 TABLET DAILY AS DIRECTED; Therapy: 33VGT3022 to (Evaluate:02Jun2015)  Requested for: 73BQS7088; Last   Rx:69Qvb4145 Ordered   3  Ciprofloxacin HCl - 500 MG Oral Tablet; take one twice a day; Therapy: 77PVF9461 to (Marija Fruit)  Requested for: 40Alc7451; Last   Rx:29Feb2016 Ordered   4  Cosopt 22 3-6 8 MG/ML Ophthalmic Solution; Therapy: (29-29-69-33) to Recorded   5  Coumadin 5 MG Oral Tablet; TAKE AS DIRECTED BY CARDIOLOGY;   Therapy: 92ZBU9404 to (Evaluate:17Jun2013) Recorded   6  Furosemide 20 MG Oral Tablet; take 1 tablet by mouth once daily; Therapy: 52EIK4979 to (Evaluate:11Jun2014)  Requested for: 85FSL8262; Last   Rx:07Frm0631 Ordered   7  Glimepiride 2 MG Oral Tablet; Take 1 tablets twice a day; Therapy: 86Ctn0359 to (Evaluate:40Ilv3528)  Requested for: 34SZJ5274 Recorded   8  Janumet  MG Oral Tablet; Take 1 tablet daily as directed; Therapy: 09Ebn9018 to (Evaluate:11Nov2016)  Requested for: 19VRQ9782; Last   Rx:17Nov2015 Ordered   9  Lancets Miscellaneous; TEST TWICE A DAY AND AS NEEDED; Therapy: 44UYC3900 to ([phone])  Requested for: 67VQX9289; Last   Rx:02Oct2014 Ordered   10  Lisinopril 20 MG Oral Tablet; TAKE 1 TABLET DAILY; Therapy: 36IGJ7248 to (Evaluate:01Jun2013); Last Rx:98Zal0326 Ordered   11  LORazepam 2 MG Oral Tablet; TAKE 1 TABLET EVERY 8 HOURS AS NEEDED; Therapy: 32SFX8557 to (Evaluate:17Jun2016)  Requested for: 62WCZ5638; Last    Rx:46Rtd0665 Ordered   12  Lumigan 0 01 % Ophthalmic Solution; Therapy: 43RGJ6173 to (Last RB:93SKB3842)  Requested for: 15ASR7804 Ordered   13  Metoprolol Succinate ER 50 MG Oral Tablet Extended Release 24 Hour; TAKE 1 TABLET    TWICE A DAY; Therapy: 78Bia2553 to (Evaluate:74Osa8887)  Requested for: 13BHT2084 Recorded   14  Temazepam 30 MG Oral Capsule; TAKE 1 CAPSULE AT BEDTIME; Therapy: 85Vvu1929 to (Evaluate:85Fzd1447)  Requested for: 46ZYQ6769; Last    Rx:17Nov2015 Ordered   15  Viagra 100 MG Oral Tablet; TAKE 1 TABLET DAILY 1 HOUR BEFORE NEEDED; Therapy: 35KRC0085 to (Evaluate:08Apr2016); Last Rx:19Jan2016 Ordered    Allergies    1  Penicillins   2   Lexapro TABS   3  Zoloft TABS    Signatures   Electronically signed by : Teresa Hoang MD; Feb 29 2016 11:31AM EST                       (Co-author)

## 2018-01-14 VITALS
WEIGHT: 195.4 LBS | HEIGHT: 70 IN | DIASTOLIC BLOOD PRESSURE: 70 MMHG | BODY MASS INDEX: 27.97 KG/M2 | HEART RATE: 80 BPM | TEMPERATURE: 98 F | SYSTOLIC BLOOD PRESSURE: 126 MMHG

## 2018-01-14 VITALS
WEIGHT: 191 LBS | SYSTOLIC BLOOD PRESSURE: 128 MMHG | HEIGHT: 70 IN | DIASTOLIC BLOOD PRESSURE: 72 MMHG | TEMPERATURE: 97.5 F | HEART RATE: 78 BPM | BODY MASS INDEX: 27.35 KG/M2

## 2018-01-14 VITALS
HEIGHT: 70 IN | BODY MASS INDEX: 27.6 KG/M2 | WEIGHT: 192.8 LBS | SYSTOLIC BLOOD PRESSURE: 130 MMHG | DIASTOLIC BLOOD PRESSURE: 80 MMHG | HEART RATE: 80 BPM | TEMPERATURE: 98.8 F

## 2018-01-15 VITALS
SYSTOLIC BLOOD PRESSURE: 130 MMHG | HEART RATE: 92 BPM | WEIGHT: 195 LBS | TEMPERATURE: 98.9 F | HEIGHT: 70 IN | DIASTOLIC BLOOD PRESSURE: 80 MMHG | BODY MASS INDEX: 27.92 KG/M2

## 2018-01-15 NOTE — PROGRESS NOTES
Assessment   1  History of Traumatic subarachnoid hemorrhage (852 00) (A29 4Q8Z)    Plan    · Follow-up PRN Evaluation and Treatment  Follow-up  Status: Complete  Done:  66NGO0032  Ordered; For: PMH: Traumatic subarachnoid hemorrhage;  Ordered By: Lian Crockett  Performed:   Due: 07LXS2020    Discussion/Summary    The 4/4/17 CT head result was reviewed with patient and his daughter and his friend  CT head reports resolution of the previously identified focus of hemorrhage within the right parietal lobe  No new hemorrhage identified  There is reports stable atrophy and chronic microangiopathic change  Neurosurgical intervention is not indicated at this juncture  Patient is released to follow-up with our office on an as-needed basis from a neurosurgical standpoint  Patient advised to take fall precautions and avoid activities that would increase chances of fall or trauma to head  Fall precautions were discussed with patient, daughter, and his friend  Patient advised to follow up with his PCP for stroke risk stratification management  Resumption of any anticoagulation (ie  Coumadin) / antiplatelet medication will need to be weighed against risk of hemorrhage in this patient and recommend patient follow up with his Primary Care Provider and Cardiologist for determination of risk / benefit ratio and indication for such medication use taking into consideration patient's age, fall risk, history of traumatic intracranial hemorrhage, etc     Signs and symptoms intracranial hemorrhage/stroke were discussed with patient, daughter, and his friend and patient advised to present to emergency room immediately should he experience severe headache, mental status change, weakness, visual / speech changes, or other neurological change  Patient and his daughter and friend expressed understanding and agreement     The patient, patient's family (daughter and patient's friend) was counseled regarding diagnostic results, instructions for management, risk factor reductions, impressions  The patient has the current Goals: To review CT head result  The patent has the current Barriers: None  Patient is able to Self-Care  The treatment plan was reviewed with the patient/guardian  The patient/guardian understands and agrees with the treatment plan      Chief Complaint  Patient presents for f/u with CT head  History of Present Illness  Mr Ron Laguna is a 68year old male who presents for ongoing hospital consult (3/13/17) follow up for traumatic subarachnoid hemorrhage s/p mechanical fall  He is accompanied with his daughter and his friend  Patient was hospitalized at [facility] 3/12/17 - 3/15/17 s/p mechanical fall  His Coumadin (for history of A fib) was held because of the traumatic SAH  He was recommended clinical follow up in 2 weeks  He was seen in our office on 3/27/16 and was doing well clinically and was referred for f/u CT head to evaluate if the traumatic SAH had resolved  Patient underwent CT head 4/4/17 and presents for follow up  Patient reports he remains off Coumadin and denies taking other blood thinning medication  Patient denies any falls or injury to his head in the interval since last visit  Patient denies headache, nausea, vomiting, seizure, or dizziness  He denies memory are cognitive changes from his baseline  Patient reports his vision is at his baseline from prior to his head trauma  He has history of glaucoma and cataracts s/p surgery w/ reports of chronic pupil asymmetry  Patient denies tingling, numbness, or weakness  He ambulates independent and denies gait difficulty  He follows with Dr Ashley Ramirez of Cardiology (in [facility])  Patient reports he's been complaining his routine home task / chores  Review of Systems    Constitutional: no fever, not feeling poorly, no recent weight gain, no chills, not feeling tired and no recent weight loss     Eyes: no eye pain, no eyesight problems, no dryness of the eyes, eyes not red, no purulent discharge from the eyes and no itching of the eyes  ENT: no earache, no nosebleeds, no sore throat, no hearing loss, no nasal discharge and no hoarseness  Cardiovascular: the heart rate was not slow, no chest pain, the heart rate was not fast and no palpitations  Respiratory: no shortness of breath, no cough, no wheezing and no shortness of breath during exertion  Gastrointestinal: no abdominal pain, no nausea, no vomiting, no constipation, no diarrhea and no blood in stools  Genitourinary: no dysuria and no incontinence  Musculoskeletal: no arthralgias, no joint swelling, no limb pain, no myalgias, no joint stiffness and no limb swelling  Integumentary: no rashes, no itching, no dry skin, no skin lesions and no skin wound  Neurological: no headache, no numbness, no tingling, no confusion, no dizziness, no limb weakness, no convulsions, no fainting and no difficulty walking  Psychiatric: not suicidal, no anxiety, no sleep disturbances and no depression  Hematologic/Lymphatic: no tendency for easy bleeding and no tendency for easy bruising  ROS reviewed  Active Problems   1  Abrasion of elbow, left (913 0) (S50 312A)  2  Anxiety (300 00) (F41 9)  3  Atrial fibrillation (427 31) (I48 91)  4  BPH with obstruction/lower urinary tract symptoms (600 01,599 69) (N40 1,N13 8)  5  Chronic gout, without tophus  6  Congestive heart failure (428 0) (I50 9)  7  Disc degeneration, lumbar (722 52) (M51 36)  8  DM neuropathy, type II diabetes mellitus (250 60,357 2) (E11 40)  9  Erectile dysfunction of non-organic origin (302 72) (F52 21)  10  Grief reaction (309 0) (F43 20)  11  Hematoma of frontal scalp (920) (S00 03XA)  12  Hyperlipidemia (272 4) (E78 5)  13  Hypertension (401 9) (I10)  14  Hyperthyroidism (242 90) (E05 90)  15  Nasal bone fracture (802 0) (S02  2XXA)  16  Paroxysmal supraventricular tachycardia (427 0) (I47 1)  17  Periorbital hematoma (376 32) (H05 239)  18  Pleural effusion (511 9) (J90)  19  Pneumonitis (486) (J18 9)  20  Presence of cardiac pacemaker (V45 01) (Z95 0)  21  Primary insomnia (307 42) (F51 01)  22  Secondary pulmonary hypertension (416 8) (I27 2)  23  Tinea pedis of both feet (110 4) (B35 3)  24  Tricuspid regurgitation (397 0) (I07 1)    Past Medical History   1  History of Abdominal pain, RUQ (789 01) (R10 11)  2  History of Abnormal liver enzymes (790 5) (R74 8)  3  History of Acute upper respiratory infection (465 9) (J06 9)  4  History of Bronchospasm (519 11) (J98 01)  5  History of Cellulitis of right lower leg (682 6) (L03 115)  6  History of Chronic Spontaneous Pneumothorax (512 83)  7  History of Community acquired pneumonia (5) (J18 9)  8  History of Difficulty breathing (786 09) (R06 89)  9  History of acute bronchitis (V12 69) (Z87 09)  10  History of fever (V13 89) (Z87 898)  11  History of glaucoma (V12 49) (Z86 69)  12  History of voice disturbance (V13 89) (Z87 898)  13  History of wheezing (V12 69) (Z87 898)  14  History of Leg pain, bilateral (729 5) (M79 604,M79 605)  15  History of Paroxysmal atrial fibrillation (427 31) (I48 0)  16  Pleural effusion (511 9) (J90)  17  History of Pneumonia (V12 61)  18  History of Primary open angle glaucoma (365 11,365 70) (H40 1190)  19  History of Skin Rash Generalized  20  History of Traumatic subarachnoid hemorrhage (852 00) (S06 6X9A)  21  History of UTI (urinary tract infection), bacterial (599 0,041 9) (N39 0,A49  9)    The active problems and past medical history were reviewed and updated today  Surgical History   1  History of Cholecystectomy Laparoscopic  2  History of Complete Colonoscopy  3  History of Partial Pulmonary Decortication    The surgical history was reviewed and updated today  Family History  Mother   1  Family history of   2  Family history of liver cancer (V16 0) (Z80 0)  Father   3   Family history of   4  Family history of dementia (V17 2) (Z81 8)  Daughter   11  Family history of Coagulation factor disorder  Son   6  Family history of Diabetes Mellitus (V18 0)    The family history was reviewed and updated today  Social History    · Alcohol Use (History)   · Daily alcohol use   · Denied: Drug Use (305 90)   · Former smoker (K48 70) (W19 558)   · Has 5 children   · High school or GED   · Non-smoker (V49 89) (Z78 9)   · Sexually active   ·    · Working Part-time  The social history was reviewed and updated today  Current Meds  1  Beryl Contour Next Test In [employer]; TEST TWICE DAILY; Therapy: 28SPG8265 to ([facility])  Requested for: 55CQB2812; Last   Rx:2014 Ordered  2  Bimatoprost 0 03 % Ophthalmic Solution; INSTILL 1 DROP INTO BOTH EYES THREE   TIMES DAILY IN THE EVENING; Therapy: 16Yqm8295 to Recorded  3  Brimonidine Tartrate 0 15 % Ophthalmic Solution; use as directed; Therapy: 69UNS3021 to Recorded  4  Econazole Nitrate 1 % External Cream; APPLY AND GENTLY MASSAGE INTO AFFECTED   AREA(S) TWICE DAILY; Therapy: 24LJA8524 to (Last Rx:2017)  Requested for: 83HWR1652 Ordered  5  Glimepiride 2 MG Oral Tablet; Take 1 tablets twice a day; Therapy: 60Ukw4683 to (Evaluate:2018)  Requested for: 14EHC9629; Last   Rx:2017 Ordered  6  Lancets Miscellaneous; TEST TWICE A DAY AND AS NEEDED; Therapy: 32HHZ8079 to ([facility])  Requested for: 48DQS0522; Last   Rx:2014 Ordered  7  Lisinopril 20 MG Oral Tablet; TAKE 1 TABLET DAILY; Therapy: 28LGC4839 to (Evaluate:2013); Last Rx:64Atr3193 Ordered  8  LORazepam 2 MG Oral Tablet; take 1 tablet every 6 hours as needed; Therapy: 31QGL1517 to (Evaluate:17Bro1014)  Requested for: 2016; Last   Rx:2016 Ordered  9  Metoprolol Succinate ER 25 MG Oral Tablet Extended Release 24 Hour; Take 1 tablet   twice daily;    Therapy: 38Ywo5072 to (Evaluate:2014)  Requested for: 54YLQ3042 Recorded  10  Temazepam 30 MG Oral Capsule; TAKE 1 BY MOUTH AT BEDTIME; Therapy: 50Dxe7547 to (Evaluate:73Yra2178)  Requested for: 16JPO6094; Last    Rx:31Jan2017 Ordered  11  Timolol Maleate 0 5 % Ophthalmic Solution; use as directed; Therapy: 10Apr2017 to Recorded  12  Viagra 100 MG Oral Tablet; TAKE 1 TABLET DAILY 1 HOUR BEFORE NEEDED; Therapy: 79APE3434 to (Evaluate:91Yem0042); Last Rx:53Dso4436 Ordered    The medication list was reviewed and updated today  Allergies   1  Penicillins  2  Lexapro TABS  3  Zoloft TABS    Vitals  Vital Signs    Recorded: 10Apr2017 11:05AM   Temperature 98 3 F, Tympanic   Heart Rate 82   Respiration 16   Systolic 688, LUE, Sitting   Diastolic 84, LUE, Sitting   Height 5 ft 10 in   Weight 185 lb    BMI Calculated 26 54   BSA Calculated 2 02     Physical Exam     Constitutional Patient appears healthy and well developed  No signs of acute distress present  Respiratory Respiratory effort: Normal    Neurologic - Mental Status: Alert and Oriented x3  Mood and affect: Affect is normal   Memory is grossly intact  Attention is WNL  Sameera Skipper Speech is articulated and fluent  Knowledge and vocabulary consistent with education  Names 3 cities in [place] correct  ID's pen, point, and function to write correct  ID's colors x 3 correct  Grossly nonfocal   Judgment and insight: Normal     Cranial Nerve Exam:  2nd CN: Pupils round with left pupil (~4mm) slightly larger then right pupil (~3 5mm)  3rd, 4th, and 6th cranial nerves: Normal with no deficit  5th CN: Sensation to LT intact b/l V1-V3  Masseter intact b/l  7th cranial nerve: Face symmetrical at grimace and at rest  8th cranial nerve: Grossly intact to finger rub bilaterally  9th and 10th cranial nerves: Uvula is midline  11th cranial nerve: Shoulder shrug equal bilaterally  12th cranial nerve: Tongue mideline, no atrophy present  Motor System - Upper Extremities: Normal to inspection and palpation   Strength: Deltoids 5/5 bilaterally  Biceps 5/5 bilaterally  Triceps 5/5 bilaterally  Extensor carpi radials is 5/5 bilaterally  Extensor digitorum 5/5 bilaterally  Intrinsic 5/5 bilaterally   5/5 bilaterally  Motor System - Lower Extremities: Normal to inspection and palpation  Strength: iliopsoas 5/5 bilaterally  Quadriceps 5/5 bilaterally  Hamstrings 5/5 bilaterally  Gastrocnemius 5/5 bilaterally  Coordination: No pronator drift  Finger to nose intact bilaterally  VICTORINO intact fingers bilaterally  Involuntary movements: None   Sensory: Sensation grossly intact to light touch  Sensation grossly intact to light touch  Gait and Station: Routine gait intact and independent  Unsteady with tandem walk -- places arm out to examination table to steady self w/ tandem walk  Results/Data  Diagnostic Studies Reviewed [facility]:   I personally reviewed the CT head in detail with the patient  * CT HEAD WO CONTRAST 68JZV5112 10:45AM Rosie Moya Order Number: [id number]   Performing Comments: follow up [facility]   - Patient Instructions: Aline Debra 04/04/2017 AT 11:00 AM DMD     Test Name Result Flag Reference   CT HEAD WO CONTRAST (Report)     CT BRAIN - WITHOUT CONTRAST     INDICATION: Follow-up traumatic subarachnoid hemorrhage  COMPARISON: 3/12/2017  TECHNIQUE: CT examination of the brain was performed  In addition to axial images, coronal reformatted images were created and submitted for interpretation  Radiation dose length product (DLP) for this visit: 1025 59 mGy-cm   This examination, like all CT scans performed in the [facility], was performed utilizing techniques to minimize radiation dose exposure, including the use of    iterative reconstruction and automated exposure control  IMAGE QUALITY: Diagnostic  FINDINGS:      PARENCHYMA: Cerebral volume loss diffusely, unchanged   Ventricular white matter hypoattenuation is scattered within the cerebral hemispheres suggestive of chronic microangiopathic change  No mass, mass effect or midline shift  No hemorrhage  Stable    brainstem and cerebellum  VENTRICLES AND EXTRA-AXIAL SPACES: Stable  VISUALIZED ORBITS AND PARANASAL SINUSES: Unremarkable orbits  Paranasal sinuses are clear  CALVARIUM AND EXTRACRANIAL SOFT TISSUES: Normal calvarium  Resolution of the previously seen preseptal and anterior frontal extracranial hematoma  IMPRESSION:     Resolution of the previously identified focus of hemorrhage within the right parietal lobe  No new hemorrhage identified  Stable atrophy and chronic microangiopathic change  Resolution of the previously seen extracranial and preseptal hematomas  Workstation performed: [id number]     Signed by:   Evette Sherwood DO   4/4/17     Health Management  History of Encounter for screening colonoscopy   COLONOSCOPY; every 10 years; Last 56RTN1530; Next Due: 12XZL2418; Active    Future Appointments    Date/Time Provider Specialty Site   05/02/2017 01:00 PM Dennis Lux MD Family Medicine Halina Guillaume MD     Signatures   Electronically signed by : DALY Garcia;  Apr 11 2017  6:34AM EST                       (Author)    Electronically signed by : RITA Greenfield ; Apr 11 2017 11:56AM EST                       (Author)

## 2018-01-16 NOTE — RESULT NOTES
Verified Results  * XR CHEST PA & LATERAL [id number] 10:53AM Yessy Matter Order Number: [id number]     Test Name Result Flag Reference   XR CHEST PA & LATERAL (Report)     CHEST - DUAL ENERGY     INDICATION: Cough  History of COPD  COMPARISON: October 15, 2014     VIEWS: PA (including soft tissue/bone algorithms) and lateral projections; 4 images     FINDINGS:        The heart mediastinum are stable  Single-lead pacing device unchanged in position and appearance  Lungs are well-inflated  No evidence of focal consolidation, pulmonary edema pleural effusion  Visualized osseous structures appear within normal limits for the patient's age  IMPRESSION:     No active pulmonary disease  Workstation performed: [id number]     Signed by:   Emily Johnson MD   12/19/16       Discussion/Summary    please notify pt that his CXR was negative - no sign of any pneumonia or abnormality - will start abx d/t severity of symptoms - rx for azithryomycin sent to pharmacy with the cough medication   Patient aware

## 2018-01-16 NOTE — RESULT NOTES
Verified Results  * XR CHEST PA & LATERAL [id number] 02:02PM Bhavesh Thompson Order Number: [id number]     Test Name Result Flag Reference   XR CHEST PA & LATERAL (Report)     CHEST - DUAL ENERGY     INDICATION: Chest pain, fatigue for a week, shortness of breath, history of COPD  COMPARISON: Multiple prior exams most recent of which is dated December 19, 2016  VIEWS: PA (including soft tissue/bone algorithms) and lateral projections     IMAGES: 4     FINDINGS: Left subclavian approach single lead pacemaker is in position  Cardiomediastinal silhouette is unchanged in width and contour  The lungs are clear  No pneumothorax or pleural effusion  Visualized osseous structures appear within normal limits for the patient's age  IMPRESSION:     No active pulmonary disease         Workstation performed: [id number]     Signed by:   Anusha Bess MD   8/21/17

## 2018-01-17 NOTE — MISCELLANEOUS
Message   Recorded as Task   Date: 04/11/2017 12:08 PM, Created By: Yusuf Gomez   Task Name: Care Coordination   Assigned To: Yusuf Gomez   Regarding Patient: Olivia Rome, Status: Active   Comment:    Radha Kaur - 11 Apr 2017 12:08 PM     TASK CREATED  Igor Lovell with "[employer]" ([phone]) questions if this pt may resume Coumadin  Reviewed office note dated 04/10/17 which indicated that PCP/Cardiologist whoever manages Coumadin would need to determine if the benefits outweigh the risks of resuming  She requested a copy of note which will be faxed once -signed     Radha Kaur - 12 Apr 2017 10:52 AM     TASK EDITED  Note faxed to [facility] at [phone]        Signatures   Electronically signed by : Mani Gomez, ; Apr 12 2017 10:52AM EST                       (Author)

## 2018-01-17 NOTE — MISCELLANEOUS
Message     Recorded as Task   Date: 09/19/2017 04:26 PM, Created By: [address]   Task Name: Medical Complaint Callback   Assigned To: [address] [place] [address]   Regarding Patient: Deepti Taylor, Status: Active   Comment:    Kalyn Livingston - 19 Sep 2017 4:26 PM     TASK CREATED  Caller: Self; Medical Complaint; [phone] (Home); [phone] (Work)  Pt has heartburn everyday  He wants medication  He tried the Nexium the low dose doesn't do anything  He said his body is changing  Isabel Aguiar - 19 Sep 2017 4:29 PM     TASK REASSIGNED: Previously Assigned To [address] [place] [address]  Please, advise, Richard Sahu - 19 Sep 2017 4:46 PM     TASK REPLIED TO: Previously Assigned To Richard Wilkes  eRx Pantoprazole done   PT AWARE        Active Problems    1  Anxiety (300 00) (F41 9)   2  Atopic dermatitis, unspecified type (691 8) (L20 9)   3  Atrial fibrillation (427 31) (I48 91)   4  BPH with obstruction/lower urinary tract symptoms (600 01,599 69) (N40 1,N13 8)   5  Bug bite, initial encounter (919 4) (W57 XXXA)   6  Chest pain (786 50) (R07 9)   7  Chronic GERD (530 81) (K21 9)   8  Chronic gout, without tophus   9  Congestive heart failure (428 0) (I50 9)   10  Disc degeneration, lumbar (722 52) (M51 36)   11  DM neuropathy, type II diabetes mellitus (250 60,357 2) (E11 40)   12  Encounter for prostate cancer screening (V76 44) (Z12 5)   13  Erectile dysfunction of non-organic origin (302 72) (F52 21)   14  Grief reaction (309 0) (F43 20)   15  Hematoma of frontal scalp (920) (S00 03XA)   16  Hyperlipidemia (272 4) (E78 5)   17  Hypertension (401 9) (I10)   18  Hyperthyroidism (242 90) (E05 90)   19  Other fatigue (780 79) (R53 83)   20  Paroxysmal supraventricular tachycardia (427 0) (I47 1)   21  Pleural effusion (511 9) (J90)   22  Pneumonitis (486) (J18 9)   23  Presence of cardiac pacemaker (V45 01) (Z95 0)   24  Primary insomnia (307 42) (F51 01)   25   Seasonal allergic rhinitis due to pollen (477 0) (J30 1)   26  Secondary pulmonary hypertension (416 8) (I27 2)   27  SOB (shortness of breath) (786 05) (R06 02)   28  Subarachnoid hemorrhage (430) (I60 9)   29  Tricuspid regurgitation (397 0) (I07 1)    Current Meds   1  Beryl Contour Next Test In [employer]; TEST TWICE DAILY; Therapy: 15OHT5428 to (Himanshu Robles)  Requested for: 36HWV1463; Last   Rx:02Oct2014 Ordered   2  Bimatoprost 0 03 % Ophthalmic Solution; INSTILL 1 DROP INTO BOTH EYES THREE   TIMES DAILY IN THE EVENING; Therapy: 55Pcf7892 to Recorded   3  Brimonidine Tartrate 0 15 % Ophthalmic Solution; use as directed; Therapy: 62KMZ1917 to Recorded   4  Econazole Nitrate 1 % External Cream; APPLY AND GENTLY MASSAGE INTO   AFFECTED AREA(S) TWICE DAILY; Therapy: 26PQK4821 to (Last Rx:31Jan2017)  Requested for: 79FUQ4178 Ordered   5  Fluocinonide 0 05 % External Solution; APPLY SPARINGLY TO AFFECTED AREA(S) 2   TO 4 TIMES DAILY AS DIRECTED; Therapy: 15EVD9176 to (Last DK:23UAJ1287)  Requested for: 62EXX2182 Ordered   6  Fluticasone Propionate 50 MCG/ACT Nasal Suspension; USE 1 TO 2 SPRAYS IN EACH   NOSTRIL ONCE DAILY; Therapy: 10LJV8613 to (Last EV:96BFV5921)  Requested for: 48OUM6867 Ordered   7  Glimepiride 2 MG Oral Tablet; Take 1 tablets twice a day; Therapy: 08Oyx1429 to (Evaluate:26Jan2018)  Requested for: 12SJA2986; Last   Rx:31Jan2017 Ordered   8  Lancets Miscellaneous; TEST TWICE A DAY AND AS NEEDED; Therapy: 54KLI9072 to (Himanshu Robles)  Requested for: 68GYH2085; Last   Rx:02Oct2014 Ordered   9  Lisinopril 20 MG Oral Tablet; TAKE 1 TABLET DAILY; Therapy: 97AGW6866 to (Evaluate:01Jun2013); Last Rx:29Cnt2110 Ordered   10  LORazepam 2 MG Oral Tablet; TAKE ONE TABLET BY MOUTH EVERY 6 HOURS AS    NEEDED; Therapy: 30ZKJ7040 to (Evaluate:08Yox8709)  Requested for: 83SYF8860; Last    Rx:21Yxw4362 Ordered   11  Metoprolol Succinate ER 25 MG Oral Tablet Extended Release 24 Hour;  Take 1 tablet twice daily; Therapy: 14Tbc0974 to (Evaluate:27Jan2014)  Requested for: 76VDB7081 Recorded   12  Pantoprazole Sodium 40 MG Oral Tablet Delayed Release; take 1 tablet by mouth once    daily; Therapy: 67Nva1079 to (Evaluate:42Zet0517)  Requested for: 13Xcq4516; Last    Rx:71Tid6537 Ordered   13  Tamsulosin HCl - 0 4 MG Oral Capsule; TAKE 1 CAPSULE AT BEDTIME; Therapy: 53HIM8564 to (Evaluate:53Yfe8438)  Requested for: 51Phv8740; Last    Rx:14Efl8948 Ordered   14  Temazepam 30 MG Oral Capsule; TAKE ONE CAPSULE BY MOUTH AT BEDTIME; Therapy: 04Zdh7319 to (Gisele Lugo)  Requested for: 23Upl9269; Last    Rx:83Hkg5812 Ordered   15  Timolol Maleate 0 5 % Ophthalmic Solution; use as directed; Therapy: 71Zad2273 to Recorded   16  Viagra 100 MG Oral Tablet; TAKE 1 TABLET DAILY 1 HOUR BEFORE NEEDED; Therapy: 82SMI2941 to (Evaluate:27Tsr3119); Last Rx:12Gek7894 Ordered    Allergies    1  Penicillins   2   Lexapro TABS   3  Zoloft TABS    Signatures   Electronically signed by : Serena Way MD; Sep 19 2017  8:49PM EST                       (Co-author)

## 2018-01-22 VITALS
SYSTOLIC BLOOD PRESSURE: 132 MMHG | HEIGHT: 70 IN | DIASTOLIC BLOOD PRESSURE: 84 MMHG | RESPIRATION RATE: 16 BRPM | WEIGHT: 185 LBS | BODY MASS INDEX: 26.48 KG/M2 | HEART RATE: 82 BPM | TEMPERATURE: 98.3 F

## 2018-01-22 VITALS
HEIGHT: 70 IN | BODY MASS INDEX: 26.8 KG/M2 | SYSTOLIC BLOOD PRESSURE: 142 MMHG | TEMPERATURE: 98.1 F | WEIGHT: 187.2 LBS | HEART RATE: 84 BPM | DIASTOLIC BLOOD PRESSURE: 90 MMHG

## 2018-02-05 ENCOUNTER — OFFICE VISIT (OUTPATIENT)
Dept: FAMILY MEDICINE CLINIC | Facility: HOSPITAL | Age: 78
End: 2018-02-05
Payer: MEDICARE

## 2018-02-05 VITALS
RESPIRATION RATE: 14 BRPM | BODY MASS INDEX: 26.77 KG/M2 | SYSTOLIC BLOOD PRESSURE: 120 MMHG | DIASTOLIC BLOOD PRESSURE: 80 MMHG | HEART RATE: 80 BPM | WEIGHT: 187 LBS | HEIGHT: 70 IN | TEMPERATURE: 96.2 F

## 2018-02-05 DIAGNOSIS — H40.2223 CHRONIC PRIMARY ANGLE-CLOSURE GLAUCOMA OF LEFT EYE, SEVERE STAGE: Primary | ICD-10-CM

## 2018-02-05 DIAGNOSIS — I48.21 PERMANENT ATRIAL FIBRILLATION (HCC): Chronic | ICD-10-CM

## 2018-02-05 DIAGNOSIS — I10 ESSENTIAL HYPERTENSION: ICD-10-CM

## 2018-02-05 DIAGNOSIS — E11.40 TYPE 2 DIABETES MELLITUS WITH DIABETIC NEUROPATHY, WITHOUT LONG-TERM CURRENT USE OF INSULIN (HCC): ICD-10-CM

## 2018-02-05 PROBLEM — J30.1 SEASONAL ALLERGIC RHINITIS DUE TO POLLEN: Status: ACTIVE | Noted: 2017-05-02

## 2018-02-05 PROBLEM — K21.9 CHRONIC GERD: Status: ACTIVE | Noted: 2017-09-19

## 2018-02-05 PROCEDURE — 99214 OFFICE O/P EST MOD 30 MIN: CPT | Performed by: FAMILY MEDICINE

## 2018-02-05 RX ORDER — LORAZEPAM 2 MG/1
1 TABLET ORAL EVERY 6 HOURS PRN
COMMUNITY
Start: 2012-01-18 | End: 2018-05-04 | Stop reason: SDUPTHER

## 2018-02-05 RX ORDER — PREDNISOLONE ACETATE 10 MG/ML
SUSPENSION/ DROPS OPHTHALMIC
COMMUNITY
Start: 2018-02-02 | End: 2018-02-27

## 2018-02-05 RX ORDER — ACETAZOLAMIDE 500 MG/1
CAPSULE, EXTENDED RELEASE ORAL
COMMUNITY
Start: 2018-02-02 | End: 2018-08-20

## 2018-02-05 RX ORDER — BIMATOPROST 0.3 MG/ML
SOLUTION/ DROPS OPHTHALMIC
COMMUNITY
Start: 2017-04-10 | End: 2018-08-20

## 2018-02-05 RX ORDER — MOXIFLOXACIN 5 MG/ML
SOLUTION/ DROPS OPHTHALMIC
COMMUNITY
Start: 2018-02-02 | End: 2018-02-27

## 2018-02-05 RX ORDER — FLUOCINONIDE TOPICAL SOLUTION USP, 0.05% 0.5 MG/ML
SOLUTION TOPICAL
COMMUNITY
Start: 2017-05-02 | End: 2018-08-20

## 2018-02-05 RX ORDER — SILDENAFIL 100 MG/1
1 TABLET, FILM COATED ORAL
COMMUNITY
Start: 2014-11-12 | End: 2018-02-27 | Stop reason: SDUPTHER

## 2018-02-05 RX ORDER — TIMOLOL MALEATE 6.8 MG/ML
SOLUTION/ DROPS OPHTHALMIC
COMMUNITY
Start: 2017-04-10 | End: 2019-03-11 | Stop reason: ALTCHOICE

## 2018-02-05 RX ORDER — PANTOPRAZOLE SODIUM 40 MG/1
1 TABLET, DELAYED RELEASE ORAL DAILY
COMMUNITY
Start: 2017-09-19 | End: 2018-10-18

## 2018-02-05 RX ORDER — TEMAZEPAM 30 MG/1
1 CAPSULE ORAL
COMMUNITY
Start: 2011-08-31 | End: 2018-08-20

## 2018-02-05 RX ORDER — FUROSEMIDE 40 MG/1
TABLET ORAL
COMMUNITY
Start: 2018-01-15 | End: 2018-02-27

## 2018-02-05 RX ORDER — TRAVOPROST 0.004 %
DROPS OPHTHALMIC (EYE)
COMMUNITY
Start: 2018-01-15 | End: 2018-02-27

## 2018-02-05 RX ORDER — BRIMONIDINE TARTRATE 0.1 %
DROPS OPHTHALMIC (EYE)
COMMUNITY
Start: 2018-01-15 | End: 2018-08-20

## 2018-02-05 RX ORDER — DORZOLAMIDE HYDROCHLORIDE AND TIMOLOL MALEATE 20; 5 MG/ML; MG/ML
SOLUTION/ DROPS OPHTHALMIC
COMMUNITY
Start: 2017-11-14 | End: 2018-02-27

## 2018-02-05 RX ORDER — TAMSULOSIN HYDROCHLORIDE 0.4 MG/1
1 CAPSULE ORAL
COMMUNITY
Start: 2013-11-08 | End: 2018-03-26 | Stop reason: SDUPTHER

## 2018-02-05 RX ORDER — LANCETS 28 GAUGE
EACH MISCELLANEOUS
COMMUNITY
Start: 2014-01-14

## 2018-02-05 RX ORDER — LISINOPRIL 20 MG/1
TABLET ORAL
COMMUNITY
Start: 2018-01-15 | End: 2018-02-27

## 2018-02-05 NOTE — PROGRESS NOTES
Assessment/Plan:       Diagnoses and all orders for this visit:    Chronic primary angle-closure glaucoma of left eye, severe stage  Comments:  Medically cleared for OS surgery on 2/20/18 with Dr Gabe Tellez    Permanent atrial fibrillation [facility])  Comments:  A fib stable, paced rhythm    Essential hypertension  Comments:  Stable BP on current medications    Type 2 diabetes mellitus with diabetic neuropathy, without long-term current use of insulin ([address] )  Comments:  Clinically stable DM    Other orders  -     dorzolamide-timolol (COSOPT) 22 3-6 8 MG/ML ophthalmic solution;   -     TRAVATAN Z 0 004 % ophthalmic solution;   -     acetaZOLAMIDE (DIAMOX) 500 mg capsule;   -     Glucose Blood (CARLOS CONTOUR NEXT TEST VI); by In Vitro route 2 (two) times a day  -     bimatoprost (LUMIGAN) 0 03 % ophthalmic drops; Apply to eye  -     ALPHAGAN P 0 1 %;   -     fluocinonide (LIDEX) 0 05 % external solution; Apply topically  -     furosemide (LASIX) 40 mg tablet;   -     Lancets (FREESTYLE) lancets; by Does not apply route  -     lisinopril (ZESTRIL) 20 mg tablet;   -     LORazepam (ATIVAN) 2 mg tablet; Take 1 tablet by mouth every 6 (six) hours as needed  -     metoprolol tartrate (LOPRESSOR) 25 mg tablet;   -     moxifloxacin (VIGAMOX) 0 5 % ophthalmic solution;   -     pantoprazole (PROTONIX) 40 mg tablet; Take 1 tablet by mouth daily  -     prednisoLONE acetate (PRED FORTE) 1 % ophthalmic suspension;   -     tamsulosin (FLOMAX) 0 4 mg; Take 1 capsule by mouth  -     temazepam (RESTORIL) 30 mg capsule; Take 1 capsule by mouth  -     Timolol Maleate 0 5 % (DAILY) SOLN; Apply to eye  -     sildenafil (VIAGRA) 100 mg tablet; Take 1 tablet by mouth          Subjective:      Patient ID: Laura Acuna is a 68 y o  male  Preop visit for OS tube shunt with patch graft OS with Dr Gabe Tellez 2/20/18  Glucometers are all in the low 100's without any hypoglycemia  Appetite is very good    Seeing Dr Sandip Delatorre for sinus congestion and was onm an antibiotic for 14 days  The following portions of the patient's history were reviewed and updated as appropriate: allergies, current medications, past family history, past medical history, past social history, past surgical history and problem list     Review of Systems   Constitutional: Negative for appetite change and fatigue  HENT: Positive for sinus pressure  Negative for facial swelling  Eyes: Positive for visual disturbance  Respiratory: Negative for shortness of breath  Gastrointestinal: Negative for abdominal distention  Musculoskeletal: Negative for gait problem and joint swelling  Neurological: Negative for seizures and weakness  Psychiatric/Behavioral: The patient is not nervous/anxious  Objective:     Physical Exam   Constitutional: He appears well-developed and well-nourished  HENT:   Head: Normocephalic and atraumatic  Right Ear: External ear normal    Left Ear: External ear normal    Nose: Nose normal    Mouth/Throat: No oropharyngeal exudate  Eyes: Conjunctivae are normal    Neck: No thyromegaly present  Cardiovascular: Normal rate, regular rhythm and normal heart sounds  Pulmonary/Chest: Effort normal and breath sounds normal    Musculoskeletal: He exhibits no edema  Lymphadenopathy:     He has no cervical adenopathy  Skin: No rash noted  Psychiatric: He has a normal mood and affect   His behavior is normal  Judgment and thought content normal

## 2018-02-26 RX ORDER — AMOXICILLIN AND CLAVULANATE POTASSIUM 875; 125 MG/1; MG/1
TABLET, FILM COATED ORAL
COMMUNITY
Start: 2018-02-17 | End: 2018-02-27 | Stop reason: SDUPTHER

## 2018-02-26 RX ORDER — LISINOPRIL 20 MG/1
1 TABLET ORAL DAILY
COMMUNITY
Start: 2012-11-19 | End: 2018-02-27

## 2018-02-26 RX ORDER — BRIMONIDINE TARTRATE 0.15 %
DROPS OPHTHALMIC (EYE)
COMMUNITY
Start: 2017-03-27

## 2018-02-26 RX ORDER — WARFARIN SODIUM 5 MG/1
TABLET ORAL
COMMUNITY
Start: 2018-01-15 | End: 2018-11-17

## 2018-02-26 RX ORDER — FLUTICASONE PROPIONATE 50 MCG
2 SPRAY, SUSPENSION (ML) NASAL DAILY
COMMUNITY
Start: 2017-05-02 | End: 2018-08-20

## 2018-02-26 RX ORDER — GLIMEPIRIDE 2 MG/1
TABLET ORAL
COMMUNITY
Start: 2011-08-31 | End: 2018-12-24 | Stop reason: SDUPTHER

## 2018-02-26 RX ORDER — METOPROLOL SUCCINATE 25 MG/1
1 TABLET, EXTENDED RELEASE ORAL 2 TIMES DAILY
COMMUNITY
Start: 2011-08-31

## 2018-02-27 ENCOUNTER — OFFICE VISIT (OUTPATIENT)
Dept: FAMILY MEDICINE CLINIC | Facility: HOSPITAL | Age: 78
End: 2018-02-27
Payer: MEDICARE

## 2018-02-27 VITALS
WEIGHT: 187.4 LBS | SYSTOLIC BLOOD PRESSURE: 108 MMHG | HEART RATE: 68 BPM | DIASTOLIC BLOOD PRESSURE: 74 MMHG | BODY MASS INDEX: 26.83 KG/M2 | TEMPERATURE: 97.9 F | HEIGHT: 70 IN

## 2018-02-27 DIAGNOSIS — E11.40 TYPE 2 DIABETES MELLITUS WITH DIABETIC NEUROPATHY, WITHOUT LONG-TERM CURRENT USE OF INSULIN (HCC): ICD-10-CM

## 2018-02-27 DIAGNOSIS — Z00.00 MEDICARE ANNUAL WELLNESS VISIT, SUBSEQUENT: Primary | ICD-10-CM

## 2018-02-27 DIAGNOSIS — I48.21 PERMANENT ATRIAL FIBRILLATION (HCC): Chronic | ICD-10-CM

## 2018-02-27 DIAGNOSIS — N52.1 ERECTILE DISORDER DUE TO MEDICAL CONDITION IN MALE: ICD-10-CM

## 2018-02-27 DIAGNOSIS — I10 ESSENTIAL HYPERTENSION: ICD-10-CM

## 2018-02-27 PROCEDURE — G0402 INITIAL PREVENTIVE EXAM: HCPCS | Performed by: FAMILY MEDICINE

## 2018-02-27 RX ORDER — SILDENAFIL 100 MG/1
100 TABLET, FILM COATED ORAL DAILY PRN
Qty: 20 TABLET | Refills: 3 | Status: SHIPPED | OUTPATIENT
Start: 2018-02-27 | End: 2018-02-27 | Stop reason: SDUPTHER

## 2018-02-27 RX ORDER — SILDENAFIL 100 MG/1
100 TABLET, FILM COATED ORAL DAILY PRN
Qty: 20 TABLET | Refills: 0 | Status: SHIPPED | OUTPATIENT
Start: 2018-02-27 | End: 2018-06-27 | Stop reason: SDUPTHER

## 2018-02-27 NOTE — PATIENT INSTRUCTIONS

## 2018-02-27 NOTE — PROGRESS NOTES
Assessment/Plan:       Diagnoses and all orders for this visit:    Medicare annual wellness visit, subsequent    Type 2 diabetes mellitus with diabetic neuropathy, without long-term current use of insulin ([facility] [address] )    Essential hypertension    Permanent atrial fibrillation ([facility] [address] )    Erectile disorder due to medical condition in male  -     Discontinue: sildenafil (VIAGRA) 100 mg tablet; Take 1 tablet (100 mg total) by mouth daily as needed for erectile dysfunction  -     sildenafil (VIAGRA) 100 mg tablet; Take 1 tablet (100 mg total) by mouth daily as needed for erectile dysfunction          Subjective:      Patient ID: Cynthia Fung is a 68 y o  male  Had surgery on OS and is awaiting how his results will ultimately be  Glucometers are in the low or mid 100's        The following portions of the patient's history were reviewed and updated as appropriate: allergies, current medications, past family history, past medical history, past social history, past surgical history and problem list     Review of Systems   Constitutional: Negative for chills, fatigue and unexpected weight change  HENT: Negative for ear discharge and rhinorrhea  Eyes: Positive for visual disturbance  Respiratory: Negative for cough, choking and shortness of breath  Cardiovascular: Negative for chest pain, palpitations and leg swelling  Gastrointestinal: Negative for abdominal pain  Genitourinary: Negative for difficulty urinating  Musculoskeletal: Negative for arthralgias  Skin: Negative for rash  Neurological: Negative for weakness and headaches  Psychiatric/Behavioral: Positive for sleep disturbance  The patient is not nervous/anxious            Objective:      /74 (BP Location: Left arm, Patient Position: Sitting, Cuff Size: Large)   Pulse 68   Temp 97 9 °F (36 6 °C) (Tympanic)   Ht 5' 10" (1 778 m)   Wt 85 kg (187 lb 6 4 oz)   BMI 26 89 kg/m²     AWV Clinical     ISAR:   Previous hospitalizations?:  Yes   How many hospitalizations have you had in the last year?:  1-2   Additional Comments:  Pacemaker placement       Once in a Lifetime Medicare Screening:   EKG performed?:  Yes    AAA screening performed? (if performed, please add date to Health Maintenance):  No       Medicare Screening Tests and Risk Assessment:   AAA Risk Assessment    Age over 72 (males only):  Yes    Osteoporosis Risk Assessment    :  Yes    Age over 48:  Yes    HIV Risk Assessment    None indicated:  Yes        Drug and Alcohol Use:   Tobacco use    Cigarettes:  former smoker    Smokeless:  never used smokeless tobacco    Tobacco use duration    Tobacco Cessation Readiness    Alcohol use    Alcohol use:  frequent use    Alcohol Treatment Readiness   Illicit Drug Use    Drug use:  never        Diet & Exercise:   Diet   What is your diet?:  Regular   How many servings a day of the following:   Fruits and Vegetables:  3-4 Meat:  1-2   Whole Grains:  0    Dairy:  1 Soda:  0   Coffee:  2 Tea:  2   Exercise    Do you currently exercise?:  currently not exercising       Cognitive Impairment Screening:   Cognitive Impairment Screening        Functional Ability/Level of Safety:   Hearing     Bilateral:  significantly decreased   Left:  significantly decreased Right:  significantly decreased   Hearing aid:  No    Hearing Impairment Assessment    Hearing status:  Hard of hearing   Current Activities    Status:  unlimited ADL's, unlimited driving, unlimited IADL's, unlimited social activities   Help needed with the folllowing:    ADL    Fall Risk   Have you fallen in the last 12 months?:  Yes    How many times?:  1    Injury History       Home Safety:   Home Safety Risk Factors   Unfamilar with surroundings:  No Uneven floors:  No   Stairs or handrail saftey risk:  No Loose rugs:  Yes   Household clutter:  No Poor household lighting:  No   No grab bars in bathroom:  Yes Further evaluation needed:  No       Advanced Directives:   Advanced Directives Patient's End of Life Decisions        Urinary Incontinence:       Glaucoma:           Provider Screening     Preventative Screening/Counseling:   Cardiovascular Screening/Counseling:   (Labs Q5 years, EKG optional one-time)   General:  Screening Current           Diabetes Screening/Counseling:   (2 tests/year if Pre-Diabetes or 1 test/year if no Diabetes)   General:  Screening Current           Colorectal Cancer Screening/Counseling:   (FOBT Q1 yr; Flex Sig Q4 yrs or Q10 yrs after Screening Colonoscopy; Screening Colonoscpy Q2 yrs High Risk or Q10 yrs Low Risk; Barium Enema Q2 yrs High Risk or Q4 yrs Low Risk)   General:  Screening Current           Prostate Cancer Screening/Counseling:   (Annual)    General:  Screening Current          Breast Cancer Screening/Counseling:   (Baseline Age 28 - 43; Annual Age 36+)         Cervical Cancer Screening/Counseling:   (Annual for High Risk or Childbearing Age with Abnormal Pap in Last 3 yrs; Every 2 all others)         Osteoporosis Screening/Counseling:   (Every 2 Yrs if at risk or more if medically necessary)   General:  Screening Not Indicated           AAA Screening/Counseling:   (Once per Lifetime with risk factors)     Age over 72 (males only):  Yes    General:  Patient Declines           Glaucoma Screening/Counseling:   (Annual)   General:  Screening Current          HIV Screening/Counseling:   (Voluntary; Once annually for high risk OR 3 times for Pregnancy at diagnosis of IUP; 3rd trimester; and at Labor   General:  Screening Not Indicated           Hepatitis C Screening:             Immunizations:   Influenza (annual):  Patient Declines   Pneumococcal (Once in a Lifetime):  Lifetime Vaccine Completed   Hepatitis B Series (low risk patients):  Series Not Indicated   Zostavax (Medicare D Coverage, Pt >66 yo):  Zostavax Vaccine UTD   TD (Non-Medicare Wellness  Visit required): Td Vaccine UTD   Tdap (Non-Medicare Wellness Visit required):   Tdap Vaccine UTD Other Preventative Couseling (Non-Medicare Wellness Visit Required):   fall prevention education provided       Referrals (Non-Medicare Wellness Visit Required):       Medical Equipment/Suppliers:   none              Physical Exam   Constitutional: He is oriented to person, place, and time  HENT:   Head: Normocephalic and atraumatic  Neck: Neck supple  Cardiovascular: Normal rate, regular rhythm, normal heart sounds and intact distal pulses  Pulmonary/Chest: Effort normal and breath sounds normal    Musculoskeletal: He exhibits no edema  Neurological: He is alert and oriented to person, place, and time  Psychiatric: He has a normal mood and affect   His behavior is normal  Judgment and thought content normal

## 2018-03-26 DIAGNOSIS — N40.0 BENIGN PROSTATIC HYPERPLASIA, UNSPECIFIED WHETHER LOWER URINARY TRACT SYMPTOMS PRESENT: Primary | ICD-10-CM

## 2018-03-26 RX ORDER — TAMSULOSIN HYDROCHLORIDE 0.4 MG/1
0.4 CAPSULE ORAL
Qty: 90 CAPSULE | Refills: 0 | Status: SHIPPED | OUTPATIENT
Start: 2018-03-26 | End: 2018-03-27 | Stop reason: SDUPTHER

## 2018-03-27 ENCOUNTER — TRANSCRIBE ORDERS (OUTPATIENT)
Dept: ADMINISTRATIVE | Facility: HOSPITAL | Age: 78
End: 2018-03-27

## 2018-03-27 DIAGNOSIS — N40.0 BENIGN PROSTATIC HYPERPLASIA, UNSPECIFIED WHETHER LOWER URINARY TRACT SYMPTOMS PRESENT: ICD-10-CM

## 2018-03-27 DIAGNOSIS — R13.12 OROPHARYNGEAL DYSPHAGIA: Primary | ICD-10-CM

## 2018-03-27 DIAGNOSIS — K21.00 GASTROESOPHAGEAL REFLUX DISEASE WITH ESOPHAGITIS: ICD-10-CM

## 2018-03-27 RX ORDER — TAMSULOSIN HYDROCHLORIDE 0.4 MG/1
0.4 CAPSULE ORAL
Qty: 90 CAPSULE | Refills: 3 | Status: SHIPPED | OUTPATIENT
Start: 2018-03-27 | End: 2019-04-13 | Stop reason: SDUPTHER

## 2018-03-29 ENCOUNTER — HOSPITAL ENCOUNTER (OUTPATIENT)
Dept: RADIOLOGY | Facility: HOSPITAL | Age: 78
Discharge: HOME | End: 2018-03-29
Payer: MEDICARE

## 2018-03-29 DIAGNOSIS — K21.00 GASTROESOPHAGEAL REFLUX DISEASE WITH ESOPHAGITIS: ICD-10-CM

## 2018-03-29 DIAGNOSIS — R13.12 OROPHARYNGEAL DYSPHAGIA: ICD-10-CM

## 2018-03-29 PROCEDURE — 74220 X-RAY XM ESOPHAGUS 1CNTRST: CPT

## 2018-04-06 ENCOUNTER — HOSPITAL ENCOUNTER (OUTPATIENT)
Dept: RADIOLOGY | Facility: HOSPITAL | Age: 78
Discharge: HOME | End: 2018-04-06
Payer: MEDICARE

## 2018-04-06 DIAGNOSIS — K21.00 GASTROESOPHAGEAL REFLUX DISEASE WITH ESOPHAGITIS: ICD-10-CM

## 2018-04-06 DIAGNOSIS — R13.12 OROPHARYNGEAL DYSPHAGIA: ICD-10-CM

## 2018-04-06 PROCEDURE — G8998 SWALLOW D/C STATUS: HCPCS

## 2018-04-06 PROCEDURE — 92611 MOTION FLUOROSCOPY/SWALLOW: CPT

## 2018-04-06 PROCEDURE — G8997 SWALLOW GOAL STATUS: HCPCS

## 2018-04-06 PROCEDURE — 74230 X-RAY XM SWLNG FUNCJ C+: CPT

## 2018-04-06 PROCEDURE — G8996 SWALLOW CURRENT STATUS: HCPCS

## 2018-04-06 NOTE — PROCEDURES
Video Swallow Study      Patient Name: Hayde   EARL Date: 4/6/2018        Past Medical History  Past Medical History:   Diagnosis Date    Abnormal liver enzymes 02/18/2013    AF (atrial fibrillation) (HCC)     Anxiety     BPH (benign prostatic hyperplasia)     Cataract     Diabetes mellitus ([facility] [address] )     Disease of thyroid gland     Glaucoma     Glaucoma, bilateral 04/30/2013    Hypertension     Insomnia     Pacemaker     2 5 yr ago    Paroxysmal atrial fibrillation ([facility] [address] ) 12/03/2012    Pneumonia 12/03/2012    Primary open angle glaucoma 11/29/2012    Pulmonary HTN     Recurrent spontaneous pneumothorax 10/02/2013    Traumatic sub[facility]oid hemorrhage ([facility] ) 04/10/2017        Past Surgical History  Past Surgical History:   Procedure Laterality Date    ATRIAL CARDIAC PACEMAKER INSERTION      CATARACT EXTRACTION, BILATERAL      L - 3 months ago, R prior    CHOLECYSTECTOMY      COLONOSCOPY  10/15/2015    polyp in decending colon; angioectasias; follow up colo if clinically indicated    LAPAROSCOPIC CHOLECYSTECTOMY  05/2014    LUNG DECORTICATION Right     Partial Pulmonary Decortation    UMBILICAL HERNIA REPAIR           Pt is a 67 y/o male seen today for a VBS  Pt had a barium swallow last week, with multiple episodes of silent aspiration of thin barium  Spoke with [employer] that assisted in the barium swallow; she reports most of the aspiration was during consecutive sips of barium, and also while lying down  Pt reports a several week hx of difficulty swallowing, occasionally feeling like "food doesn't want to go down right"  He avoids dry foods, and typically uses sauces and gravies to moisten foods  Pt reports he had pneumonia 4 years ago, but not since  He is mobile, lives alone, and is independent with ADL's       Esophagram results (3/29/18)-  Silent aspiration seen on a few occasions with multiple episodes of laryngeal penetration and some pooling of secretions in the vallecula  Recommend dedicated video barium swallow with speech pathology    No significant dysmotility, mucosal lesion or evidence for stricture      PMH: See above      Previous VBS: N/A, but pt silently aspirated thin liquids during barium swallow    Current Diet: Regular and thin liquids, but avoids hard dry foods, moistens foods with sauces  Dentition: Adequate, wears partial dentures    O2 requirement: Room air    Oral mech:  Strength and ROM: Appears grossly intact    Vocal Quality/Speech: Slightly breathy, slightly weak    Cognitive status: Appears WFL in conversation  Consistencies administered: Barium laden applesauce, soft solid (chopped peaches, chicken salad), hard solid (cookies),  nectar thick, thin liquids, 13mm barium pill  Liquids were administered by spoon, cup and straw  Pt was seated laterally at 90 degrees  Pt viewed in AP position    Oral stage:  Min-Mild  Lip closure: Adequate  Mastication: Mildly prolonged, reduced efficiency   Bolus formation: Mildly slow, mildly reduced cohesion of bolus  Bolus control: Mildly reduced  Transfer: Mildly reduced control during transfer  Residue: Mild, on base of tongue and posterior lateral sulci, mostly clears with second swallow  Barium tablet: Difficulty transferring the pill posteriorly, required multiple sips of liquid, unable to transfer with thin liquid, but was able to transfer the pill with a sip of NTL       Pharyngeal stage:  Mild-mod  Swallow promptness: Prompt to mildly delayed, with premature spill over tongue base  Spill to valleculae: Yes, frequent  Spill to pyriforms: Yes, frequent  Epiglottic inversion: Adequate  Laryngeal rise: Reduced elevation and anterior motion  Pharyngeal constriction: Mildly reduced  Vallecular retention: Mild-mod  Pyriform retention: Mild-mod  PPW coating: Min-mild  Osteophytes: Unsure if it is a true osteophyte, but there is growth/bulge at approximately C5, that pushes into the esophagus; this does not appear to effect clearance of the bolus  CP prominence: No  Transient penetration:Yes, thin and nectar thick liquids  Epiglottic undercoat: Mild  Penetration: Yes, thin and NTL, mild, mostly shallow, did not appear to reach the vocal folds  Aspiration: No aspiration   Strategies: Chin tuck prevents all but very shallow penetration of thin and NTL, second swallow clears oral/pharyngeal residue  Response to aspiration: N/A  Barium tablet: Pt took two sips of NTL to clear the pill from the vallecula; it then moved immediately into the esophagus  Screening of Esophageal stage:  A brief scan of the esophagus was completed  Appeared grossly normal  Pt had a barium swallow last week, with results showing overall normal appearing esophagus (see impressions above)  Summary:  Min-mild oral and mild-mod pharyngeal dysphagia, characterized by mildly slow mastication, bolus formation, and transfer, oral residue that clears with a second swallow, premature spill over tongue base into the vallecula and pyriforms, mildly delayed swallows, reduced hyolaryngeal elevation and anterior motion  Most pharyngeal residue clears with second swallow  Penetration of thin liquids and NTL occurred during the swallow, but no aspiration observed  Most penetration was fairly shallow, with min-mild undercoating of the epiglottis  A chin tuck prevented most penetration of thin and NTL  No aspiration was viewed during the study, but pt is at risk        Recommendations:  Diet: Soft, moist foods, avoid dry, crunchy foods  Liquids:  Cup or straw, single sips, chin tuck position  Meds: Whole in applesauce or pudding  Strategies: Eat slowly, small bites/sips, chin tuck with liquids, swallow 2x per bite/sip, occasional throat clear/cough to clear any penetrated material   Upright position  F/u ST tx: Pt would benefit from speech/swallow tx to improve oral/pharyngeal swallow, reduce risk of aspiration  Therapy Prognosis: Good  Prognosis considerations:  Age, cognition, motivation  Pt is independent  Aspiration Precautions  Reflux Precautions  Results reviewed with: pt, faxed to physician  -discussed results and recommendations in detail with patient  Pt given a written sheet with all recommendations  He verbalized understanding  If a dedicated assessment of the esophagus is desired, consider esophagram/barium swallow

## 2018-04-24 LAB
CREAT ?TM UR-SCNC: 87.4 UMOL/L
HBA1C MFR BLD HPLC: 5.9 %

## 2018-05-02 RX ORDER — LORAZEPAM 2 MG/1
2 TABLET ORAL EVERY 6 HOURS PRN
Qty: 360 TABLET | Refills: 0 | Status: CANCELLED | OUTPATIENT
Start: 2018-05-02

## 2018-05-04 DIAGNOSIS — F41.9 ANXIETY: Primary | ICD-10-CM

## 2018-05-04 RX ORDER — LORAZEPAM 2 MG/1
2 TABLET ORAL EVERY 6 HOURS PRN
Qty: 360 TABLET | Refills: 0 | Status: SHIPPED | OUTPATIENT
Start: 2018-05-04 | End: 2018-06-02 | Stop reason: SDUPTHER

## 2018-05-04 NOTE — TELEPHONE ENCOUNTER
Please refill Lorazepam 3 mo supply 4 pills a day to [facility]

## 2018-06-02 DIAGNOSIS — F41.9 ANXIETY: ICD-10-CM

## 2018-06-03 RX ORDER — LORAZEPAM 2 MG/1
TABLET ORAL
Qty: 360 TABLET | Refills: 0 | Status: SHIPPED | OUTPATIENT
Start: 2018-06-03 | End: 2018-08-20

## 2018-06-26 RX ORDER — TRAVOPROST 0.004 %
DROPS OPHTHALMIC (EYE)
COMMUNITY
Start: 2018-04-21 | End: 2018-08-20

## 2018-06-26 RX ORDER — IPRATROPIUM BROMIDE 42 UG/1
SPRAY, METERED NASAL
COMMUNITY
Start: 2018-05-27 | End: 2018-10-18

## 2018-06-26 RX ORDER — LISINOPRIL 20 MG/1
TABLET ORAL
COMMUNITY
Start: 2018-03-29

## 2018-06-27 ENCOUNTER — OFFICE VISIT (OUTPATIENT)
Dept: FAMILY MEDICINE CLINIC | Facility: HOSPITAL | Age: 78
End: 2018-06-27
Payer: MEDICARE

## 2018-06-27 VITALS
SYSTOLIC BLOOD PRESSURE: 118 MMHG | DIASTOLIC BLOOD PRESSURE: 66 MMHG | BODY MASS INDEX: 26.89 KG/M2 | WEIGHT: 177.4 LBS | TEMPERATURE: 98.1 F | HEIGHT: 68 IN | HEART RATE: 80 BPM

## 2018-06-27 DIAGNOSIS — I10 ESSENTIAL HYPERTENSION: ICD-10-CM

## 2018-06-27 DIAGNOSIS — E78.2 MIXED HYPERLIPIDEMIA: ICD-10-CM

## 2018-06-27 DIAGNOSIS — J20.9 BRONCHITIS, ACUTE, WITH BRONCHOSPASM: ICD-10-CM

## 2018-06-27 DIAGNOSIS — E05.90 HYPERTHYROIDISM: ICD-10-CM

## 2018-06-27 DIAGNOSIS — E11.40 TYPE 2 DIABETES MELLITUS WITH DIABETIC NEUROPATHY, WITHOUT LONG-TERM CURRENT USE OF INSULIN (HCC): Primary | ICD-10-CM

## 2018-06-27 DIAGNOSIS — N52.1 ERECTILE DISORDER DUE TO MEDICAL CONDITION IN MALE: ICD-10-CM

## 2018-06-27 DIAGNOSIS — I48.0 PAROXYSMAL ATRIAL FIBRILLATION (HCC): Chronic | ICD-10-CM

## 2018-06-27 DIAGNOSIS — R63.4 WEIGHT LOSS: ICD-10-CM

## 2018-06-27 PROCEDURE — 99214 OFFICE O/P EST MOD 30 MIN: CPT | Performed by: FAMILY MEDICINE

## 2018-06-27 RX ORDER — SILDENAFIL 100 MG/1
100 TABLET, FILM COATED ORAL DAILY PRN
Qty: 20 TABLET | Refills: 3 | Status: SHIPPED | OUTPATIENT
Start: 2018-06-27 | End: 2018-10-31 | Stop reason: SDUPTHER

## 2018-06-27 RX ORDER — AMOXICILLIN AND CLAVULANATE POTASSIUM 875; 125 MG/1; MG/1
1 TABLET, FILM COATED ORAL EVERY 12 HOURS SCHEDULED
Qty: 14 TABLET | Refills: 1 | Status: SHIPPED | OUTPATIENT
Start: 2018-06-27 | End: 2018-07-04

## 2018-06-27 NOTE — PROGRESS NOTES
Assessment/Plan:         Diagnoses and all orders for this visit:    Type 2 diabetes mellitus with diabetic neuropathy, without long-term current use of insulin (HCC)  -     Comprehensive metabolic panel; Future  -     HEMOGLOBIN A1C W/ EAG ESTIMATION; Future    Essential hypertension  -     CBC and differential; Future    Paroxysmal atrial fibrillation (HCC)  Comments:  Continue anticoagulation and F/U with Dr Melania Gilbert    Mixed hyperlipidemia  -     Lipid Panel with Direct LDL reflex; Future    Bronchitis, acute, with bronchospasm  -     amoxicillin-clavulanate (AUGMENTIN) 875-125 mg per tablet; Take 1 tablet by mouth every 12 (twelve) hours for 7 days    Hyperthyroidism  -     TSH, 3rd generation with T4 reflex; Future    Weight loss  Comments:  No red flag signs at this time  Erectile disorder due to medical condition in male  -     sildenafil (VIAGRA) 100 mg tablet; Take 1 tablet (100 mg total) by mouth daily as needed for erectile dysfunction          Subjective:      Patient ID: Araseli Riddle is a 66 y o  male  Feeling good most of the time but gets anxiety and SOB    Unclear why he is losing weight  Some chest tightness  Follows with Dr Melania Gilbert for upcoming visit  Has a cane but not using it    Glucometers are all low 100 range    Seen by ENT for throat issue, was put on Augmentin and it helped his breathing as well  The following portions of the patient's history were reviewed and updated as appropriate: allergies, current medications, past family history, past medical history, past social history, past surgical history and problem list     Review of Systems   Constitutional: Negative  HENT: Negative  Eyes: Positive for visual disturbance  Respiratory: Negative  Cardiovascular: Negative  Gastrointestinal: Negative  Endocrine: Negative  Genitourinary: Negative  Musculoskeletal: Negative  Allergic/Immunologic: Negative  Neurological: Negative      Hematological: Negative  Psychiatric/Behavioral: The patient is nervous/anxious  Objective:      /66 (BP Location: Left arm, Patient Position: Sitting, Cuff Size: Standard)   Pulse 80   Temp 98 1 °F (36 7 °C) (Tympanic)   Ht 5' 8" (1 727 m)   Wt 80 5 kg (177 lb 6 4 oz)   BMI 26 97 kg/m²          Physical Exam   Constitutional: He is oriented to person, place, and time  He appears well-nourished  Eyes: Conjunctivae are normal    Neck: Neck supple  Cardiovascular: Normal rate, regular rhythm, normal heart sounds and intact distal pulses  Pulmonary/Chest: Effort normal and breath sounds normal    Musculoskeletal: He exhibits no edema  Neurological: He is oriented to person, place, and time  Psychiatric: He has a normal mood and affect  His behavior is normal  Judgment and thought content normal    Nursing note and vitals reviewed

## 2018-06-29 PROBLEM — I48.0 PAROXYSMAL ATRIAL FIBRILLATION (HCC): Chronic | Status: ACTIVE | Noted: 2017-03-13

## 2018-08-20 ENCOUNTER — APPOINTMENT (OUTPATIENT)
Dept: LAB | Facility: HOSPITAL | Age: 78
End: 2018-08-20
Payer: MEDICARE

## 2018-08-20 ENCOUNTER — OFFICE VISIT (OUTPATIENT)
Dept: FAMILY MEDICINE CLINIC | Facility: HOSPITAL | Age: 78
End: 2018-08-20
Payer: MEDICARE

## 2018-08-20 VITALS
DIASTOLIC BLOOD PRESSURE: 86 MMHG | SYSTOLIC BLOOD PRESSURE: 112 MMHG | HEART RATE: 86 BPM | WEIGHT: 160.8 LBS | TEMPERATURE: 96.8 F | BODY MASS INDEX: 24.37 KG/M2 | HEIGHT: 68 IN

## 2018-08-20 DIAGNOSIS — R13.10 DYSPHAGIA, UNSPECIFIED TYPE: ICD-10-CM

## 2018-08-20 DIAGNOSIS — R63.0 ANOREXIA: ICD-10-CM

## 2018-08-20 DIAGNOSIS — I48.0 PAROXYSMAL ATRIAL FIBRILLATION (HCC): Chronic | ICD-10-CM

## 2018-08-20 DIAGNOSIS — F32.A DEPRESSION, UNSPECIFIED DEPRESSION TYPE: ICD-10-CM

## 2018-08-20 DIAGNOSIS — R63.4 WEIGHT LOSS, ABNORMAL: ICD-10-CM

## 2018-08-20 DIAGNOSIS — R63.4 WEIGHT LOSS, ABNORMAL: Primary | ICD-10-CM

## 2018-08-20 LAB
ALBUMIN SERPL BCP-MCNC: 3.3 G/DL (ref 3.5–5)
ALP SERPL-CCNC: 113 U/L (ref 46–116)
ALT SERPL W P-5'-P-CCNC: 35 U/L (ref 12–78)
ANION GAP SERPL CALCULATED.3IONS-SCNC: 8 MMOL/L (ref 4–13)
AST SERPL W P-5'-P-CCNC: 21 U/L (ref 5–45)
BILIRUB SERPL-MCNC: 1.5 MG/DL (ref 0.2–1)
BUN SERPL-MCNC: 16 MG/DL (ref 5–25)
CALCIUM SERPL-MCNC: 9.3 MG/DL (ref 8.3–10.1)
CHLORIDE SERPL-SCNC: 101 MMOL/L (ref 100–108)
CO2 SERPL-SCNC: 29 MMOL/L (ref 21–32)
CREAT SERPL-MCNC: 1.05 MG/DL (ref 0.6–1.3)
ERYTHROCYTE [DISTWIDTH] IN BLOOD BY AUTOMATED COUNT: 13.4 % (ref 11.6–15.1)
GFR SERPL CREATININE-BSD FRML MDRD: 68 ML/MIN/1.73SQ M
GLUCOSE P FAST SERPL-MCNC: 126 MG/DL (ref 65–99)
HCT VFR BLD AUTO: 45.3 % (ref 36.5–49.3)
HGB BLD-MCNC: 15.3 G/DL (ref 12–17)
INR PPP: 3.29 (ref 0.86–1.17)
MAGNESIUM SERPL-MCNC: 1.6 MG/DL (ref 1.6–2.6)
MCH RBC QN AUTO: 31.5 PG (ref 26.8–34.3)
MCHC RBC AUTO-ENTMCNC: 33.8 G/DL (ref 31.4–37.4)
MCV RBC AUTO: 93 FL (ref 82–98)
PLATELET # BLD AUTO: 256 THOUSANDS/UL (ref 149–390)
PMV BLD AUTO: 9.6 FL (ref 8.9–12.7)
POTASSIUM SERPL-SCNC: 4 MMOL/L (ref 3.5–5.3)
PROT SERPL-MCNC: 7.4 G/DL (ref 6.4–8.2)
PROTHROMBIN TIME: 31.7 SECONDS (ref 11.8–14.2)
RBC # BLD AUTO: 4.86 MILLION/UL (ref 3.88–5.62)
SODIUM SERPL-SCNC: 138 MMOL/L (ref 136–145)
TSH SERPL DL<=0.05 MIU/L-ACNC: 0.42 UIU/ML (ref 0.36–3.74)
WBC # BLD AUTO: 6.49 THOUSAND/UL (ref 4.31–10.16)

## 2018-08-20 PROCEDURE — 36415 COLL VENOUS BLD VENIPUNCTURE: CPT

## 2018-08-20 PROCEDURE — 84443 ASSAY THYROID STIM HORMONE: CPT

## 2018-08-20 PROCEDURE — 85027 COMPLETE CBC AUTOMATED: CPT

## 2018-08-20 PROCEDURE — 83735 ASSAY OF MAGNESIUM: CPT

## 2018-08-20 PROCEDURE — 99215 OFFICE O/P EST HI 40 MIN: CPT | Performed by: FAMILY MEDICINE

## 2018-08-20 PROCEDURE — 86803 HEPATITIS C AB TEST: CPT

## 2018-08-20 PROCEDURE — 87389 HIV-1 AG W/HIV-1&-2 AB AG IA: CPT

## 2018-08-20 PROCEDURE — 80053 COMPREHEN METABOLIC PANEL: CPT

## 2018-08-20 PROCEDURE — 85610 PROTHROMBIN TIME: CPT

## 2018-08-20 PROCEDURE — 84154 ASSAY OF PSA FREE: CPT

## 2018-08-20 PROCEDURE — 84153 ASSAY OF PSA TOTAL: CPT

## 2018-08-20 PROCEDURE — 86618 LYME DISEASE ANTIBODY: CPT

## 2018-08-20 RX ORDER — MIRTAZAPINE 7.5 MG/1
7.5 TABLET, FILM COATED ORAL
Qty: 30 TABLET | Refills: 1 | Status: SHIPPED | OUTPATIENT
Start: 2018-08-20 | End: 2018-10-18

## 2018-08-20 NOTE — PROGRESS NOTES
[facility]  [address]  [address], [address]  Tel: [phone]      Assessment/Plan:    Problem List Items Addressed This Visit        Cardiovascular and Mediastinum    Paroxysmal atrial fibrillation (HCC) (Chronic)    Relevant Orders    Protime-INR      Other Visit Diagnoses     Weight loss, abnormal    -  Primary    Relevant Medications    mirtazapine (REMERON) 7 5 MG tablet    Other Relevant Orders    CBC (Completed)    Comprehensive metabolic panel    TSH, 3rd generation with Free T4 reflex    Magnesium    HIV 1/2 AG-AB combo    Hepatitis C antibody    Lyme Antibody Profile with reflex to WB    PSA, total and free    Ambulatory referral to Gastroenterology    CT chest abdomen pelvis w contrast    Dysphagia, unspecified type        Relevant Orders    Ambulatory referral to Gastroenterology    CT chest abdomen pelvis w contrast    Anorexia        Relevant Medications    mirtazapine (REMERON) 7 5 MG tablet    Other Relevant Orders    CT chest abdomen pelvis w contrast    Depression, unspecified depression type        Relevant Medications    mirtazapine (REMERON) 7 5 MG tablet        1  Acute weight loss  14% decrease over the past 6 months  10% weight loss over the past 2 months  Agree that this necessitates further workup  Without ongong dysphagia  Referral to GI for consideration of EGD  Labs as outlined  Will also obtain Ct scan of chest/abdomen/pelvis  Does have anorexia which I do think ultimately is due to depression  Will give a trial of low dose remeron 7 5 mg nightly  2  Atrial fibrillation  Stable  On coumadin  Agree to check INR as well  Co managed with cardiology  As he brought up he may consider going to xarelto for ease of use  But may also use other agents to include pradaxa or eliquis  F/u in 4 weeks  Spent 45 minutes, 50% discussing plan/counselling regarding weight loss and plan  _____________________________________________________________________    History of Present Illness:     Patient is here for an acute visit      bruise (Bilteral arms - on coumadin ); Fatigue; Weight Loss; and Bleeding/Bruising    Here for concern for weight loss  Over the past several months he has had weight loss  He admits to poor appetitte  He has poor dentition and does not wear his false teeth  He lost his wife 2 years ago  Had financial problems/swindled  He has poort sleep, feels anxious, felt depressed  Reports having some trouble swallowing  Does not recall having an EGD done  He did have a scope done through ENT showing some postnasal drip/congestion  Did have a cscope done bisi he was 76years old  No new medications  Does have a lot of bruising  He does know he is on coumadin  Looking into getting xarelto  This is for atrial fibrillation and is managed through Dr Lanette Mares, Cardiology  Fatigue   Associated symptoms include congestion and fatigue  Pertinent negatives include no abdominal pain, arthralgias, chest pain, chills, coughing, diaphoresis, fever, joint swelling, myalgias, nausea, sore throat or vomiting            -----------------------------  Review of Systems   Constitutional: Positive for fatigue and unexpected weight change  Negative for activity change, appetite change, chills, diaphoresis and fever  HENT: Positive for congestion, dental problem and trouble swallowing  Negative for facial swelling, hearing loss, mouth sores, nosebleeds, rhinorrhea, sinus pain, sinus pressure and sore throat  Eyes: Negative for discharge and itching  Respiratory: Negative for apnea, cough, choking and chest tightness  Cardiovascular: Negative for chest pain and palpitations  Gastrointestinal: Negative for abdominal distention, abdominal pain, blood in stool, constipation, diarrhea, nausea and vomiting     Endocrine: Negative for cold intolerance, heat intolerance, polydipsia and polyphagia  Genitourinary: Negative for difficulty urinating and dysuria  Musculoskeletal: Negative for arthralgias, back pain, gait problem, joint swelling and myalgias  Hematological: Negative for adenopathy  Does not bruise/bleed easily  Psychiatric/Behavioral: Positive for decreased concentration and sleep disturbance  Negative for agitation, behavioral problems, confusion, hallucinations, self-injury and suicidal ideas  The patient is nervous/anxious  The patient is not hyperactive  Social Hx reviewed:    Social History     Social History    Marital status:      Spouse name: N/A    Number of children: N/A    Years of education: N/A     Occupational History    Not on file       Social History Main Topics    Smoking status: Former Smoker     Quit date: 1991    Smokeless tobacco: Never Used    Alcohol use 12 6 - 16 8 oz/week     21 - 28 Shots of liquor per week      Comment: patient states 3-4 whiskey drinks daily    Drug use: No    Sexual activity: Yes     Other Topics Concern    Not on file     Social History Narrative    Has 5 children    High School or GED        Working Part Time       Past Medical History:   Diagnosis Date    Abnormal liver enzymes 02/18/2013    AF (atrial fibrillation) (HCC)     Anxiety     BPH (benign prostatic hyperplasia)     Cataract     Diabetes mellitus ([facility] )     Disease of thyroid gland     Glaucoma     Glaucoma, bilateral 04/30/2013    Hypertension     Insomnia     Pacemaker     2 5 yr ago    Paroxysmal atrial fibrillation ([facility] ) 12/03/2012    Pneumonia 12/03/2012    Primary open angle glaucoma 11/29/2012    Pulmonary HTN ([facility] )     Recurrent spontaneous pneumothorax 10/02/2013    Traumatic subarachnoid hemorrhage ([place] [address] ) 04/10/2017           Allergies   Allergen Reactions    Escitalopram Drowsiness     Reaction Date: 27Apr2011;     Sertraline Drowsiness     Reaction Date: 27Apr2011;     Penicillins Rash         Vitals:    08/20/18 1020   BP: 112/86   Pulse: 86   Temp: (!) 96 8 °F (36 °C)     Physical Exam            To call our office if any concerns/questions at [phone]

## 2018-08-21 LAB
B BURGDOR IGG SER IA-ACNC: 0.34
B BURGDOR IGM SER IA-ACNC: 0.09
HCV AB SER QL: NORMAL
PSA FREE MFR SERPL: 25 %
PSA FREE SERPL-MCNC: 0.3 NG/ML
PSA SERPL-MCNC: 1.2 NG/ML (ref 0–4)

## 2018-08-23 LAB — HIV 1+2 AB+HIV1 P24 AG SERPL QL IA: NORMAL

## 2018-09-10 LAB
LEFT EYE DIABETIC RETINOPATHY: NORMAL
RIGHT EYE DIABETIC RETINOPATHY: NORMAL

## 2018-09-11 ENCOUNTER — OFFICE VISIT (OUTPATIENT)
Dept: FAMILY MEDICINE CLINIC | Facility: HOSPITAL | Age: 78
End: 2018-09-11
Payer: MEDICARE

## 2018-09-11 VITALS
BODY MASS INDEX: 24.23 KG/M2 | HEIGHT: 69 IN | WEIGHT: 163.6 LBS | DIASTOLIC BLOOD PRESSURE: 98 MMHG | HEART RATE: 80 BPM | TEMPERATURE: 97.2 F | SYSTOLIC BLOOD PRESSURE: 140 MMHG

## 2018-09-11 DIAGNOSIS — R05.9 COUGH: Primary | ICD-10-CM

## 2018-09-11 PROBLEM — J20.9 BRONCHITIS, ACUTE, WITH BRONCHOSPASM: Status: RESOLVED | Noted: 2018-06-27 | Resolved: 2018-09-11

## 2018-09-11 PROCEDURE — 99214 OFFICE O/P EST MOD 30 MIN: CPT | Performed by: NURSE PRACTITIONER

## 2018-09-11 RX ORDER — MOXIFLOXACIN 5 MG/ML
SOLUTION/ DROPS OPHTHALMIC
COMMUNITY
Start: 2018-09-10 | End: 2018-10-18

## 2018-09-11 RX ORDER — ALBUTEROL SULFATE 90 UG/1
2 AEROSOL, METERED RESPIRATORY (INHALATION) EVERY 6 HOURS PRN
Qty: 8.5 G | Refills: 0 | Status: SHIPPED | OUTPATIENT
Start: 2018-09-11 | End: 2018-11-02 | Stop reason: SDUPTHER

## 2018-09-11 NOTE — PROGRESS NOTES
Assessment/Plan:    No problem-specific Assessment & Plan notes found for this encounter  Diagnoses and all orders for this visit:    Cough  Comments:  subacute - ? allergic/bronchospastic, CXR 8/2017 so will hold off on repeat at this time, eval further w/PFTs, use inhaler as rx'd   Orders:  -     Spirometry pre and post bronchodilator; Future  -     albuterol (PROAIR HFA) 90 mcg/act inhaler; Inhale 2 puffs every 6 (six) hours as needed for wheezing    Other orders  -     moxifloxacin (VIGAMOX) 0 5 % ophthalmic solution;       F/U with Dr Jessie Birmingham for routine OV - if cough not improving may need repeat CXR or lung CT, beto in light of recent weight loss  Subjective:      Patient ID: Red Lloyd is a 66 y o  male here for an acute visit  Here for cough since Saturday night and bringing up clear sputum for "12 hours"  Has had similar episodes over the past 3 months  Had at least 10 times  Had a slight fever if 99 4  On Sunday (3 days ago) then started to sweat and it went away  Cough sounds like "a rattle, bronchial cough"  Feels intermittent sob and increased anxiety; had an episode this morning after a phone call  No hemoptysis  No ill contacts  Quit smoking at age 48  Takes claritin for allergies  The following portions of the patient's history were reviewed and updated as appropriate: allergies, current medications, past family history, past medical history, past social history, past surgical history and problem list     Review of Systems   Constitutional: Positive for diaphoresis (on sunday), fatigue, fever (no higher than 99 4 at home, but has been using a temporal thermometer and feels it may not be working) and unexpected weight change  Negative for appetite change and chills     HENT: Positive for postnasal drip, rhinorrhea (used a box of tissues today), sore throat and trouble swallowing (reports this is chronic  has prescription for a ct scan but has not gotten the test  would like the prescription reprinted)  Negative for sinus pain and sinus pressure  Congestion: worse than usual  uses a nasal spray ordered by ENT for the same  has no relief of his current symptoms  Eyes: Positive for discharge (right eye-didn't notice the color) and itching (right eye)  Saw eye doctor yesterday and was told he has an "infection" and "it could be conjunctivitis"    Respiratory: Positive for cough  Negative for chest tightness and shortness of breath  Cardiovascular: Negative for chest pain and palpitations (gets when he has an anxiety attack)  Gastrointestinal: Negative for abdominal pain, diarrhea, nausea and vomiting  Genitourinary: Negative for difficulty urinating  Skin: Positive for rash  Neurological: Negative for dizziness and headaches  Psychiatric/Behavioral: Positive for sleep disturbance (unable to sleep due to "cough and anxiety")  Objective:      /98 (Patient Position: Sitting, Cuff Size: Standard)   Pulse 80   Temp (!) 97 2 °F (36 2 °C) (Tympanic)   Ht 5' 9" (1 753 m)   Wt 74 2 kg (163 lb 9 6 oz)   BMI 24 16 kg/m²          Physical Exam   Constitutional: He is oriented to person, place, and time  He appears well-developed and well-nourished  No distress  HENT:   Head: Normocephalic and atraumatic  Eyes: Right eye exhibits discharge  Left eye exhibits no discharge  Cardiovascular: Normal rate and regular rhythm  No murmur heard  Pulmonary/Chest: Effort normal and breath sounds normal  No respiratory distress  Moist cough    Lymphadenopathy:     He has no cervical adenopathy  Neurological: He is alert and oriented to person, place, and time  Skin: Skin is warm and dry  Psychiatric: He has a normal mood and affect  Vitals reviewed

## 2018-09-12 ENCOUNTER — TELEPHONE (OUTPATIENT)
Dept: FAMILY MEDICINE CLINIC | Facility: HOSPITAL | Age: 78
End: 2018-09-12

## 2018-09-12 NOTE — TELEPHONE ENCOUNTER
Pt saw Fior Scruggs yesterday and she referred him to a pul doc and he lost the script, he wants to know if we can fax it to the pulm doc and give him their phone number so he can call to make an apt with them

## 2018-09-12 NOTE — TELEPHONE ENCOUNTER
Advise that he obtain the ct scan as previously advised  He should be having a f/u visit with me as previously advised  Will leave message for Jennifer Zarco regarding the pulm doc

## 2018-09-15 ENCOUNTER — TELEPHONE (OUTPATIENT)
Dept: FAMILY MEDICINE CLINIC | Facility: HOSPITAL | Age: 78
End: 2018-09-15

## 2018-09-15 NOTE — TELEPHONE ENCOUNTER
Has been using inhaler,  Not helping, cough is not getting any better,  States cough is more crackly,  Mucus is still white  Causing issues with sleep  Has CT tomorrow and is not sure if he will be able to drink the barium has not been able to eat much, not sure if his stomach will be able to handle  Felt feverish this morning but does not have a thermometer at home  Might reschedule CT till he feels better  Asking if there is a cough med he can use?   Uses [facility]

## 2018-09-15 NOTE — TELEPHONE ENCOUNTER
Please call pt Monday to see how he is doing    If not better needs appt with anyone by the end of the week (Per Dr Seaman De Graff)

## 2018-09-15 NOTE — TELEPHONE ENCOUNTER
Spoke to pt not sure he wants to go to urgent care to be seen  Recommend he try OTC to see if that helps with cough  States he will see how he feels and might go to urgent care    Is going to call the hospital to cancel CT

## 2018-09-15 NOTE — TELEPHONE ENCOUNTER
Inhaler given this week is not helping - still very sick, bringing up a lot of phlegm, feels feverish    pcb

## 2018-09-17 ENCOUNTER — HOSPITAL ENCOUNTER (OUTPATIENT)
Dept: PULMONOLOGY | Facility: HOSPITAL | Age: 78
Discharge: HOME | End: 2018-09-17
Payer: MEDICARE

## 2018-09-17 DIAGNOSIS — R05.9 COUGH: ICD-10-CM

## 2018-09-17 PROCEDURE — 94060 EVALUATION OF WHEEZING: CPT

## 2018-09-17 PROCEDURE — 94060 EVALUATION OF WHEEZING: CPT | Performed by: INTERNAL MEDICINE

## 2018-09-17 PROCEDURE — 94760 N-INVAS EAR/PLS OXIMETRY 1: CPT

## 2018-09-17 RX ORDER — ALBUTEROL SULFATE 2.5 MG/3ML
2.5 SOLUTION RESPIRATORY (INHALATION) EVERY 6 HOURS PRN
Status: DISCONTINUED | OUTPATIENT
Start: 2018-09-17 | End: 2018-09-21 | Stop reason: HOSPADM

## 2018-09-17 RX ADMIN — ALBUTEROL SULFATE 2.5 MG: 2.5 SOLUTION RESPIRATORY (INHALATION) at 12:58

## 2018-09-18 NOTE — TELEPHONE ENCOUNTER
Spoke to pt, states cough is doing better,  Had PFT done yesterday  States that the neb treatment given during his test really helped  Would like to know if there is an inhaler that he could use that would work as well as the neb treatment  Still has to reschedule CT

## 2018-09-18 NOTE — TELEPHONE ENCOUNTER
The proair inhaler he was given at [facility] is the same med as was in nebulizer  If he would like to get a nebulizer and albuterol for neb I can order both for him  Why did he cancel appt w/Nepo yesterday? He could have been rechecked then  Please reschedule CT ASAP

## 2018-09-18 NOTE — TELEPHONE ENCOUNTER
Pt wanted to know the results of the PFT from yesterday at 1230pm    Pt wanted to know if the inhaler would be changed due to the results  Also cancelled appt with dr Soni Zurita due to test PFT   Aware to reschedule ct

## 2018-09-19 ENCOUNTER — TELEPHONE (OUTPATIENT)
Dept: FAMILY MEDICINE CLINIC | Facility: HOSPITAL | Age: 78
End: 2018-09-19

## 2018-09-19 NOTE — TELEPHONE ENCOUNTER
Pt is looking for the results of their pulm test they had done at [facility] monday

## 2018-10-18 ENCOUNTER — OFFICE VISIT (OUTPATIENT)
Dept: FAMILY MEDICINE CLINIC | Facility: HOSPITAL | Age: 78
End: 2018-10-18
Payer: MEDICARE

## 2018-10-18 VITALS
HEIGHT: 69 IN | TEMPERATURE: 96.3 F | WEIGHT: 168 LBS | DIASTOLIC BLOOD PRESSURE: 90 MMHG | HEART RATE: 82 BPM | SYSTOLIC BLOOD PRESSURE: 142 MMHG | BODY MASS INDEX: 24.88 KG/M2

## 2018-10-18 DIAGNOSIS — R63.4 WEIGHT LOSS: ICD-10-CM

## 2018-10-18 DIAGNOSIS — R14.0 BLOATING SYMPTOM: ICD-10-CM

## 2018-10-18 DIAGNOSIS — K21.9 CHRONIC GERD: Primary | ICD-10-CM

## 2018-10-18 PROCEDURE — 99214 OFFICE O/P EST MOD 30 MIN: CPT | Performed by: FAMILY MEDICINE

## 2018-10-18 NOTE — PROGRESS NOTES
[facility]  [address] [place] [address]  Tel: [phone]      Assessment/Plan:    Problem List Items Addressed This Visit        Digestive    Chronic GERD - Primary    Relevant Orders    H  pylori antigen, stool       Other    Weight loss      Other Visit Diagnoses     Bloating symptom        Relevant Orders    H  pylori antigen, stool          Agree to h pylori testing  Advised stool antigen  Otherwise I would recommend he see gastroenterology to have a scope done with biopsy  His weight has stabilized  We discussed his weight loss last visit  He did not try the remeron as he started eating  He did not obtain ct scan of the chest/abeomen/pelvis to look for etiology of weight loss  He did not see Gastroenterology to consider cscope again to look for an etiology for his weight loss  25 minutes spent, > 50% spent discussing plan/counselling    _____________________________________________________________________    History of Present Illness:     Patient is here for an acute visit      Gas and Hoarse    Here for concerns of h pylori  He denies any abdominal pain  He has a history of reflux for which he was on protonix  He is no longer on this  No clear reason  He is concerns of intermittent bloating, gas, flatus,   And some indigestion  He has sexual intercourse with a woman a few months ago who was diagnosed with h pylori  His significant other is significantly concerned about this now  He weight has stabilized  He is eating better  Last visit we evaluated him regarding his weight loss  He was advised to get imaging studies, trial of remeron, and referral to gastroenterology  All of which he agreed to  He did not pursue any of this  He did get the blood work done                -----------------------------  Review of Systems    Social Hx reviewed:    Social History     Social History    Marital status:       Spouse name: N/A   Heather Head Number of children: N/A    Years of education: N/A     Occupational History    Not on file  Social History Main Topics    Smoking status: Former Smoker     Quit date: 1991    Smokeless tobacco: Never Used    Alcohol use 12 6 - 16 8 oz/week     21 - 28 Shots of liquor per week      Comment: patient states 3-4 whiskey drinks daily    Drug use: No    Sexual activity: Yes     Other Topics Concern    Not on file     Social History Narrative    Has 5 children    High School or GED        Working Part Time       Past Medical History:   Diagnosis Date    Abnormal liver enzymes 02/18/2013    AF (atrial fibrillation) (HCC)     Anxiety     BPH (benign prostatic hyperplasia)     Cataract     Diabetes mellitus ([facility] )     Disease of thyroid gland     Glaucoma     Glaucoma, bilateral 04/30/2013    Hypertension     Insomnia     Pacemaker     2 5 yr ago    Paroxysmal atrial fibrillation ([place] [address] ) 12/03/2012    Pneumonia 12/03/2012    Primary open angle glaucoma 11/29/2012    Pulmonary HTN ([place] [address] )     Recurrent spontaneous pneumothorax 10/02/2013    Traumatic subarachnoid hemorrhage (HCC) 04/10/2017           Allergies   Allergen Reactions    Escitalopram Drowsiness     Reaction Date: 27Apr2011;     Sertraline Drowsiness     Reaction Date: 27Apr2011;     Penicillins Rash         Vitals:    10/18/18 1411   BP: 142/90   Pulse: 82   Temp: (!) 96 3 °F (35 7 °C)     Physical Exam            To call our office if any concerns/questions at [phone]

## 2018-10-22 ENCOUNTER — APPOINTMENT (OUTPATIENT)
Dept: LAB | Facility: HOSPITAL | Age: 78
End: 2018-10-22
Payer: MEDICARE

## 2018-10-22 DIAGNOSIS — K21.9 CHRONIC GERD: ICD-10-CM

## 2018-10-22 DIAGNOSIS — R14.0 BLOATING SYMPTOM: ICD-10-CM

## 2018-10-22 PROCEDURE — 87338 HPYLORI STOOL AG IA: CPT

## 2018-10-23 LAB — H PYLORI AG STL QL IA: NEGATIVE

## 2018-10-26 ENCOUNTER — TELEPHONE (OUTPATIENT)
Dept: FAMILY MEDICINE CLINIC | Facility: HOSPITAL | Age: 78
End: 2018-10-26

## 2018-10-26 NOTE — TELEPHONE ENCOUNTER
Pt would like his A1C checked before he comes in, he wants to know if Dr Lizbeth Evans wants to order him any additional blood work before his apt

## 2018-10-31 ENCOUNTER — OFFICE VISIT (OUTPATIENT)
Dept: FAMILY MEDICINE CLINIC | Facility: HOSPITAL | Age: 78
End: 2018-10-31
Payer: MEDICARE

## 2018-10-31 VITALS
DIASTOLIC BLOOD PRESSURE: 88 MMHG | OXYGEN SATURATION: 96 % | HEART RATE: 59 BPM | BODY MASS INDEX: 25.33 KG/M2 | TEMPERATURE: 98 F | WEIGHT: 171 LBS | SYSTOLIC BLOOD PRESSURE: 138 MMHG | HEIGHT: 69 IN

## 2018-10-31 DIAGNOSIS — R63.4 ABNORMAL WEIGHT LOSS: ICD-10-CM

## 2018-10-31 DIAGNOSIS — Z23 NEED FOR INFLUENZA VACCINATION: ICD-10-CM

## 2018-10-31 DIAGNOSIS — F41.9 ANXIETY: ICD-10-CM

## 2018-10-31 DIAGNOSIS — N52.1 ERECTILE DISORDER DUE TO MEDICAL CONDITION IN MALE: ICD-10-CM

## 2018-10-31 DIAGNOSIS — I48.0 PAROXYSMAL ATRIAL FIBRILLATION (HCC): Chronic | ICD-10-CM

## 2018-10-31 DIAGNOSIS — J43.9 PULMONARY EMPHYSEMA, UNSPECIFIED EMPHYSEMA TYPE (HCC): ICD-10-CM

## 2018-10-31 DIAGNOSIS — E11.40 TYPE 2 DIABETES MELLITUS WITH DIABETIC NEUROPATHY, WITHOUT LONG-TERM CURRENT USE OF INSULIN (HCC): ICD-10-CM

## 2018-10-31 DIAGNOSIS — E78.2 MIXED HYPERLIPIDEMIA: ICD-10-CM

## 2018-10-31 DIAGNOSIS — I10 ESSENTIAL HYPERTENSION: Primary | ICD-10-CM

## 2018-10-31 PROCEDURE — 90662 IIV NO PRSV INCREASED AG IM: CPT | Performed by: FAMILY MEDICINE

## 2018-10-31 PROCEDURE — G0008 ADMIN INFLUENZA VIRUS VAC: HCPCS | Performed by: FAMILY MEDICINE

## 2018-10-31 PROCEDURE — 99215 OFFICE O/P EST HI 40 MIN: CPT | Performed by: FAMILY MEDICINE

## 2018-10-31 RX ORDER — TRAVOPROST 0.004 %
DROPS OPHTHALMIC (EYE)
COMMUNITY
Start: 2018-10-29

## 2018-10-31 RX ORDER — SILDENAFIL 100 MG/1
100 TABLET, FILM COATED ORAL DAILY PRN
Qty: 30 TABLET | Refills: 3 | Status: SHIPPED | OUTPATIENT
Start: 2018-10-31

## 2018-10-31 NOTE — PROGRESS NOTES
Assessment/Plan:         Diagnoses and all orders for this visit:    Essential hypertension  Comments:  Excellent BP control    [facility]oxysmal [facility] fibrillation ([facility] )  Comments: Follow with Dr Nancy Gilmore    Type 2 diabetes mellitus with diabetic neuropathy, without long-term current use of insulin ([facility] )  -     Comprehensive metabolic panel; Future  -     HEMOGLOBIN A1C W/ EAG ESTIMATION; Future    Mixed hyperlipidemia  -     Lipid Panel with Direct LDL reflex; Future    Anxiety  Comments:  Stable mood, able to decrease meds significantly    Pulmonary emphysema, unspecified emphysema type (HCC)  Comments:  Advised to be more consistent with use of Albuterol as it does not affect his eyes adversely like anticholinergic inhaler would    Abnormal weight loss  Comments:  Seems to be coming back up with more attention to his intake  Orders:  -     CBC and differential; Future  -     C-reactive protein; Future    Need for influenza vaccination  -     influenza vaccine, 2942-8078, high-dose, PF 0 5 mL, for patients 65 yr+ (FLUZONE HIGH-DOSE)    Erectile disorder due to medical condition in male  -     sildenafil (VIAGRA) 100 mg tablet; Take 1 tablet (100 mg total) by mouth daily as needed for erectile dysfunction    Other orders  -     TRAVATAN Z 0 004 % ophthalmic solution;           Subjective:      Patient ID: Lizeth Reveles is a 66 y o  male  Three month follow up    Biggest concern is with his eyesight which he is gradually losing  He is weaning off of Temazepam and Lorazepam     Appetite is doing better overall  Starting Xarelto in place of Warfarin through Dr Nancy Gilmore  Glucometers are good, no hypoglycemia  Doesn't feel he has a breathing problem unless he gets anxious about his breathing  He has avoided using the cnebulizer because he thought it was one that affects his breathing  PFT's did show improvement after getting the bronchodilator      He is thinking about assisted living situations once his vision is worse  The following portions of the patient's history were reviewed and updated as appropriate: allergies, current medications, past family history, past medical history, past social history, past surgical history and problem list     Review of Systems   Constitutional: Positive for fatigue and unexpected weight change  Negative for diaphoresis  HENT: Negative for congestion and mouth sores  Eyes: Positive for visual disturbance  Respiratory: Negative for cough and shortness of breath  Cardiovascular: Negative  Genitourinary: Negative for dysuria  Musculoskeletal: Negative for arthralgias  Neurological: Negative for syncope and headaches  Hematological: Bruises/bleeds easily  Psychiatric/Behavioral: Positive for sleep disturbance  Negative for behavioral problems  The patient is nervous/anxious  All other systems reviewed and are negative  Objective:      /88   Pulse 59   Temp 98 °F (36 7 °C)   Ht 5' 9" (1 753 m)   Wt 77 6 kg (171 lb)   SpO2 96%   BMI 25 25 kg/m²          Physical Exam   Constitutional: He is oriented to person, place, and time  He appears well-developed and well-nourished  HENT:   Head: Normocephalic and atraumatic  Neck: No thyromegaly present  Cardiovascular: Normal rate and regular rhythm  Pulses are weak pulses  Pulses:       Dorsalis pedis pulses are 1+ on the right side, and 1+ on the left side  Posterior tibial pulses are 1+ on the right side, and 1+ on the left side  Musculoskeletal: He exhibits no edema  Feet:   Right Foot:   Skin Integrity: Negative for ulcer, skin breakdown, erythema, warmth, callus or dry skin  Left Foot:   Skin Integrity: Negative for ulcer, skin breakdown, erythema, warmth, callus or dry skin  Neurological: He is oriented to person, place, and time  Skin: No rash noted  Psychiatric: He has a normal mood and affect   His behavior is normal  Judgment and thought content normal  Nursing note and vitals reviewed  Patient's shoes and socks removed  Right Foot/Ankle   Right Foot Inspection  Skin Exam: skin normal and skin intact no dry skin, no warmth, no callus, no erythema, no maceration, no abnormal color, no pre-ulcer, no ulcer and no callus                          Toe Exam: ROM and strength within normal limits  Sensory   Vibration: diminished  Proprioception: diminished   Monofilament testing: diminished  Vascular  Capillary refills: < 3 seconds  The right DP pulse is 1+  The right PT pulse is 1+  Left Foot/Ankle  Left Foot Inspection  Skin Exam: skin normal and skin intactno dry skin, no warmth, no erythema, no maceration, normal color, no pre-ulcer, no ulcer and no callus                         Toe Exam: ROM and strength within normal limits                   Sensory   Vibration: diminished  Proprioception: diminished  Monofilament: diminished  Vascular  Capillary refills: < 3 seconds  The left DP pulse is 1+  The left PT pulse is 1+  Assign Risk Category:  No deformity present;  Loss of protective sensation; Weak pulses       Risk: 2

## 2018-11-02 DIAGNOSIS — R05.9 COUGH: ICD-10-CM

## 2018-11-02 RX ORDER — ALBUTEROL SULFATE 90 UG/1
2 AEROSOL, METERED RESPIRATORY (INHALATION) EVERY 6 HOURS PRN
Qty: 8.5 G | Refills: 3 | Status: SHIPPED | OUTPATIENT
Start: 2018-11-02 | End: 2019-03-11 | Stop reason: SDUPTHER

## 2018-11-17 ENCOUNTER — TELEPHONE (OUTPATIENT)
Dept: FAMILY MEDICINE CLINIC | Facility: HOSPITAL | Age: 78
End: 2018-11-17

## 2018-11-17 ENCOUNTER — OFFICE VISIT (OUTPATIENT)
Dept: FAMILY MEDICINE CLINIC | Facility: HOSPITAL | Age: 78
End: 2018-11-17
Payer: MEDICARE

## 2018-11-17 VITALS
BODY MASS INDEX: 27.02 KG/M2 | TEMPERATURE: 96.2 F | HEART RATE: 81 BPM | OXYGEN SATURATION: 96 % | SYSTOLIC BLOOD PRESSURE: 130 MMHG | WEIGHT: 182.4 LBS | HEIGHT: 69 IN | DIASTOLIC BLOOD PRESSURE: 80 MMHG

## 2018-11-17 DIAGNOSIS — I10 ESSENTIAL HYPERTENSION: ICD-10-CM

## 2018-11-17 DIAGNOSIS — J43.9 PULMONARY EMPHYSEMA, UNSPECIFIED EMPHYSEMA TYPE (HCC): Primary | ICD-10-CM

## 2018-11-17 DIAGNOSIS — F41.9 ANXIETY: ICD-10-CM

## 2018-11-17 DIAGNOSIS — I48.0 PAROXYSMAL ATRIAL FIBRILLATION (HCC): Chronic | ICD-10-CM

## 2018-11-17 PROCEDURE — 99214 OFFICE O/P EST MOD 30 MIN: CPT | Performed by: FAMILY MEDICINE

## 2018-11-17 RX ORDER — LORAZEPAM 2 MG/1
1 TABLET ORAL
Qty: 30 TABLET | Refills: 0 | Status: SHIPPED | OUTPATIENT
Start: 2018-11-17 | End: 2019-01-25 | Stop reason: SDUPTHER

## 2018-11-17 NOTE — PROGRESS NOTES
Assessment/Plan:         Diagnoses and all orders for this visit:    Pulmonary emphysema, unspecified emphysema type ([facility] [address] )  Comments:  Not well controlled and he does need more than just a rescue inhaler  Restart Advair and F/U with Dr Yogesh Martinez  Orders:  -     fluticasone-salmeterol (ADVAIR HFA) 230-21 MCG/ACT inhaler; Inhale 2 puffs 2 (two) times a day Rinse mouth after use  Anxiety  Comments:  Continues to be significant and contributes greatly to his SOB  Restart hs use of Lorazepam  Orders:  -     LORazepam (ATIVAN) 2 mg tablet; Take 0 5 tablets (1 mg total) by mouth daily at bedtime    Essential hypertension  Comments:  Very good control of BP    Paroxysmal atrial fibrillation (HCC)  Comments:  Stable rate and continue anticoagulation per Dr Andrew handy  Other orders  -     rivaroxaban (XARELTO) 20 mg tablet; Take 20 mg by mouth          Subjective:      Patient ID: Nilson Coyne is a 66 y o  male  Having worse SOB over the past three days  Gets to where he has to gasp for air, even when sitting watching TV, he had thought it was anxiety which definitely makes his SOB worse, but now realizes it is more the breathing itself    Having R low back pain and was worried about pneumonia  He isnt coughing and does not have sputum production  He only has the Ventolin inhaler which he uses about 4-5 times a day  He used to have another inhaler from Dr Flory Luis but hasnt seen her for a long time  He is scheduled with Dr Yogesh Martinez 1/15/18      Still taking some Lorazepam 2mg tablets either as 1/2 or 1 tablet  Has pain in R low back  Has been doing more lifting and garage decluttering lately        The following portions of the patient's history were reviewed and updated as appropriate: allergies, current medications, past family history, past medical history, past social history, past surgical history and problem list     Review of Systems   Constitutional: Positive for unexpected weight change   Negative for diaphoresis, fatigue and fever  HENT: Negative for congestion  Respiratory: Positive for cough, shortness of breath and wheezing  Negative for chest tightness  Cardiovascular: Negative  Gastrointestinal: Negative  Genitourinary: Negative for dysuria  Musculoskeletal: Positive for back pain  Skin: Negative for rash  Hematological: Bruises/bleeds easily  Psychiatric/Behavioral: Positive for sleep disturbance  The patient is nervous/anxious  All other systems reviewed and are negative  Objective:      /80 (Patient Position: Sitting, Cuff Size: Standard)   Pulse 81   Temp (!) 96 2 °F (35 7 °C) (Tympanic)   Ht 5' 8 5" (1 74 m)   Wt 82 7 kg (182 lb 6 4 oz)   SpO2 96%   BMI 27 33 kg/m²          Physical Exam   Constitutional: He appears well-developed and well-nourished  Neck: Thyromegaly present  Cardiovascular: An irregularly irregular rhythm present  Pulmonary/Chest: He has decreased breath sounds  He has wheezes  He has rhonchi  Psychiatric: His speech is normal and behavior is normal  Judgment and thought content normal  His mood appears anxious  Cognition and memory are normal    Nursing note and vitals reviewed

## 2018-11-17 NOTE — TELEPHONE ENCOUNTER
C/o SOB and pain in side/back  No cough, no fever, no lightheadedness or dizziness  No issues with urination  States is SOB even with sitting  Has an inhaler and it seems to help a little bit  Started a couple of days ago and states it is getting worse  Pain in back is more to the right side  States this seems similar to when he had pneumonia several years ago  Did you want him scheduled?      [facility] [phone]

## 2018-11-19 ENCOUNTER — TELEPHONE (OUTPATIENT)
Dept: FAMILY MEDICINE CLINIC | Facility: HOSPITAL | Age: 78
End: 2018-11-19

## 2018-11-19 DIAGNOSIS — J43.9 PULMONARY EMPHYSEMA, UNSPECIFIED EMPHYSEMA TYPE (HCC): Primary | ICD-10-CM

## 2018-11-19 RX ORDER — BUDESONIDE AND FORMOTEROL FUMARATE DIHYDRATE 80; 4.5 UG/1; UG/1
2 AEROSOL RESPIRATORY (INHALATION) 2 TIMES DAILY
Qty: 1 INHALER | Refills: 3 | Status: SHIPPED | OUTPATIENT
Start: 2018-11-19 | End: 2019-03-11

## 2018-11-19 NOTE — TELEPHONE ENCOUNTER
Can you check with [facility] if his insurance prefers Symbicort or another inhaler less expensive than Advair?

## 2018-11-19 NOTE — TELEPHONE ENCOUNTER
I called pharm--please try symbicort  They said they will let us know--after you order it--if its covered  Thanks

## 2018-11-19 NOTE — TELEPHONE ENCOUNTER
Pt went to [facility] for a new inhaler  He left it there because it was $177 00  Is there anything else? Generic?   PCB

## 2018-11-30 ENCOUNTER — TELEPHONE (OUTPATIENT)
Dept: FAMILY MEDICINE CLINIC | Facility: HOSPITAL | Age: 78
End: 2018-11-30

## 2018-11-30 NOTE — TELEPHONE ENCOUNTER
Pt is concerned about taking symbicort because of the side effects especially glaucoma which he already has and is trying to treat, he would like to know if theres an inhaler with less side effects, please advise

## 2018-11-30 NOTE — TELEPHONE ENCOUNTER
There arent any other inhalers that dont have the potential to affect glaucoma  As long as he is getting regular checks of his eye pressures he can use the Symbicort, especially if he just does once a day

## 2018-12-03 LAB
LEFT EYE DIABETIC RETINOPATHY: NORMAL
RIGHT EYE DIABETIC RETINOPATHY: NORMAL

## 2018-12-05 ENCOUNTER — TELEPHONE (OUTPATIENT)
Dept: FAMILY MEDICINE CLINIC | Facility: HOSPITAL | Age: 78
End: 2018-12-05

## 2018-12-05 NOTE — TELEPHONE ENCOUNTER
ELIOT  Pt called to tell us he went to [facility] to see the Pulminologist Dr Pranav Boggs yesterday 12/4/2018  I called Dr Rolly Viera and canceled the 1/15/19

## 2018-12-22 ENCOUNTER — TRANSCRIBE ORDERS (OUTPATIENT)
Dept: ADMINISTRATIVE | Facility: HOSPITAL | Age: 78
End: 2018-12-22

## 2018-12-22 ENCOUNTER — APPOINTMENT (OUTPATIENT)
Dept: LAB | Facility: HOSPITAL | Age: 78
End: 2018-12-22
Payer: MEDICARE

## 2018-12-22 DIAGNOSIS — I48.91 ATRIAL FIBRILLATION, UNSPECIFIED TYPE (HCC): ICD-10-CM

## 2018-12-22 DIAGNOSIS — R06.00 DYSPNEA, UNSPECIFIED TYPE: ICD-10-CM

## 2018-12-22 DIAGNOSIS — I27.20 PROGRESSIVE PULMONARY HYPERTENSION (HCC): ICD-10-CM

## 2018-12-22 DIAGNOSIS — I27.20 PROGRESSIVE PULMONARY HYPERTENSION (HCC): Primary | ICD-10-CM

## 2018-12-22 LAB
ALBUMIN SERPL BCP-MCNC: 3.5 G/DL (ref 3.5–5)
ALP SERPL-CCNC: 139 U/L (ref 46–116)
ALT SERPL W P-5'-P-CCNC: 21 U/L (ref 12–78)
ANION GAP SERPL CALCULATED.3IONS-SCNC: 8 MMOL/L (ref 4–13)
AST SERPL W P-5'-P-CCNC: 20 U/L (ref 5–45)
BILIRUB SERPL-MCNC: 1.4 MG/DL (ref 0.2–1)
BUN SERPL-MCNC: 17 MG/DL (ref 5–25)
CALCIUM SERPL-MCNC: 9.3 MG/DL (ref 8.3–10.1)
CHLORIDE SERPL-SCNC: 100 MMOL/L (ref 100–108)
CO2 SERPL-SCNC: 31 MMOL/L (ref 21–32)
CREAT SERPL-MCNC: 1.15 MG/DL (ref 0.6–1.3)
GFR SERPL CREATININE-BSD FRML MDRD: 61 ML/MIN/1.73SQ M
GLUCOSE SERPL-MCNC: 189 MG/DL (ref 65–140)
MAGNESIUM SERPL-MCNC: 1.8 MG/DL (ref 1.6–2.6)
PHOSPHATE SERPL-MCNC: 3.8 MG/DL (ref 2.3–4.1)
POTASSIUM SERPL-SCNC: 4.2 MMOL/L (ref 3.5–5.3)
PROT SERPL-MCNC: 8.1 G/DL (ref 6.4–8.2)
SODIUM SERPL-SCNC: 139 MMOL/L (ref 136–145)

## 2018-12-22 PROCEDURE — 36415 COLL VENOUS BLD VENIPUNCTURE: CPT

## 2018-12-22 PROCEDURE — 83735 ASSAY OF MAGNESIUM: CPT

## 2018-12-22 PROCEDURE — 80053 COMPREHEN METABOLIC PANEL: CPT

## 2018-12-22 PROCEDURE — 84100 ASSAY OF PHOSPHORUS: CPT

## 2018-12-24 DIAGNOSIS — E11.40 CONTROLLED TYPE 2 DIABETES MELLITUS WITH DIABETIC NEUROPATHY, WITHOUT LONG-TERM CURRENT USE OF INSULIN (HCC): Primary | ICD-10-CM

## 2018-12-25 RX ORDER — GLIMEPIRIDE 2 MG/1
TABLET ORAL
Qty: 270 TABLET | Refills: 2 | Status: SHIPPED | OUTPATIENT
Start: 2018-12-25

## 2019-01-25 ENCOUNTER — OFFICE VISIT (OUTPATIENT)
Dept: FAMILY MEDICINE CLINIC | Facility: HOSPITAL | Age: 79
End: 2019-01-25
Payer: MEDICARE

## 2019-01-25 VITALS
BODY MASS INDEX: 24.97 KG/M2 | TEMPERATURE: 97.2 F | WEIGHT: 168.6 LBS | SYSTOLIC BLOOD PRESSURE: 114 MMHG | HEART RATE: 79 BPM | DIASTOLIC BLOOD PRESSURE: 70 MMHG | HEIGHT: 69 IN

## 2019-01-25 DIAGNOSIS — I48.0 PAROXYSMAL ATRIAL FIBRILLATION (HCC): Chronic | ICD-10-CM

## 2019-01-25 DIAGNOSIS — F41.9 ANXIETY: ICD-10-CM

## 2019-01-25 DIAGNOSIS — J43.9 PULMONARY EMPHYSEMA, UNSPECIFIED EMPHYSEMA TYPE (HCC): ICD-10-CM

## 2019-01-25 DIAGNOSIS — R63.4 ABNORMAL WEIGHT LOSS: Primary | ICD-10-CM

## 2019-01-25 DIAGNOSIS — I10 ESSENTIAL HYPERTENSION: ICD-10-CM

## 2019-01-25 DIAGNOSIS — I27.20 PULMONARY HYPERTENSION (HCC): ICD-10-CM

## 2019-01-25 PROCEDURE — 99214 OFFICE O/P EST MOD 30 MIN: CPT | Performed by: FAMILY MEDICINE

## 2019-01-25 RX ORDER — ESCITALOPRAM OXALATE 10 MG/1
TABLET ORAL
COMMUNITY
Start: 2019-01-21 | End: 2019-02-06 | Stop reason: ALTCHOICE

## 2019-01-25 RX ORDER — DORZOLAMIDE HYDROCHLORIDE AND TIMOLOL MALEATE 20; 5 MG/ML; MG/ML
SOLUTION/ DROPS OPHTHALMIC
COMMUNITY
Start: 2019-01-09

## 2019-01-25 RX ORDER — FUROSEMIDE 40 MG/1
TABLET ORAL
COMMUNITY
Start: 2019-01-14

## 2019-01-25 RX ORDER — BRIMONIDINE TARTRATE 0.1 %
DROPS OPHTHALMIC (EYE)
COMMUNITY
Start: 2019-01-18 | End: 2019-02-06 | Stop reason: SDUPTHER

## 2019-01-25 RX ORDER — LORAZEPAM 1 MG/1
1 TABLET ORAL
Qty: 30 TABLET | Refills: 1 | Status: SHIPPED | OUTPATIENT
Start: 2019-01-25 | End: 2019-04-16 | Stop reason: SDUPTHER

## 2019-01-25 NOTE — PROGRESS NOTES
Assessment/Plan:         Diagnoses and all orders for this visit:    Abnormal weight loss  Comments:  Weight fluctuating, very much influenced by mood  Depressed about not seeing his girlfriend    Anxiety  -     LORazepam (ATIVAN) 1 mg tablet; Take 1 tablet (1 mg total) by mouth daily at bedtime    Paroxysmal atrial fibrillation (HCC)  Comments:  Clinically stable, per Dr Tom Sullivan hypertension    Pulmonary emphysema, unspecified emphysema type ([facility] )  Comments:  Stable and followed by Pulmonologist    Pulmonary hypertension ([facility] )    Anxiety  Comments:  Continues to be significant and contributes greatly to his SOB  Restart hs use of Lorazepam  Orders:  -     LORazepam (ATIVAN) 1 mg tablet; Take 1 tablet (1 mg total) by mouth daily at bedtime          Subjective:      Patient ID: Cynthia Fung is a 66 y o  male  Visit for multiple issues  Seen by Dr Mary Allen (Pulmonology) and had thoracentesis for large L pleural effusion, not malignancy  He is better with breathing since he had fluid removed, not reaccumulating too fast    Concerns among many sides about his weight loss of 10-12 lbs and extreme stress/anxiety issues  Having loose bowels since on the diuretic, happens after any meal  No waking at night from it  No blood in stool    Glucoses are good  He tried the Escitalopram one day and didn't notice benefit so he stopped it  He is taking Lorazepam 2mg 1/2 tablet for sleep        The following portions of the patient's history were reviewed and updated as appropriate: allergies, current medications, past family history, past medical history, past social history, past surgical history and problem list     Review of Systems   Constitutional: Positive for activity change, appetite change, fatigue and unexpected weight change  Negative for fever  HENT: Negative  Respiratory: Positive for shortness of breath  Negative for wheezing  Cardiovascular: Negative  Gastrointestinal: Negative  Musculoskeletal: Negative  Neurological: Negative for dizziness and headaches  Hematological: Negative  Psychiatric/Behavioral: Positive for decreased concentration, dysphoric mood and sleep disturbance  The patient is nervous/anxious  All other systems reviewed and are negative  Objective:      /70   Pulse 79   Temp (!) 97 2 °F (36 2 °C)   Ht 5' 8 5" (1 74 m)   Wt 76 5 kg (168 lb 9 6 oz)   BMI 25 26 kg/m²          Physical Exam   Constitutional: He is oriented to person, place, and time  He appears well-developed and well-nourished  HENT:   Head: Atraumatic  Neck: No thyromegaly present  Cardiovascular: Normal rate and regular rhythm  Pulmonary/Chest: He has decreased breath sounds  He has no wheezes  He has rhonchi  Musculoskeletal: He exhibits no edema  Neurological: He is oriented to person, place, and time  Psychiatric: His behavior is normal  Judgment and thought content normal  His mood appears anxious  He exhibits a depressed mood  Nursing note and vitals reviewed

## 2019-02-04 ENCOUNTER — TELEPHONE (OUTPATIENT)
Dept: PULMONOLOGY | Facility: CLINIC | Age: 79
End: 2019-02-04

## 2019-02-04 NOTE — TELEPHONE ENCOUNTER
CALLED AND LEFT MESSAGE TO REMIND THAT A CHEST XRAY IS NEEDED PRIOR TO NEW PATIENT APPT  IF ANY QUESTIONS WAS TOLD TO CALL THE OFFICE

## 2019-02-06 ENCOUNTER — OFFICE VISIT (OUTPATIENT)
Dept: FAMILY MEDICINE CLINIC | Facility: HOSPITAL | Age: 79
End: 2019-02-06
Payer: MEDICARE

## 2019-02-06 VITALS
SYSTOLIC BLOOD PRESSURE: 120 MMHG | DIASTOLIC BLOOD PRESSURE: 70 MMHG | HEART RATE: 82 BPM | WEIGHT: 166.8 LBS | TEMPERATURE: 96.5 F | BODY MASS INDEX: 24.71 KG/M2 | HEIGHT: 69 IN

## 2019-02-06 DIAGNOSIS — I10 ESSENTIAL HYPERTENSION: ICD-10-CM

## 2019-02-06 DIAGNOSIS — E11.40 TYPE 2 DIABETES MELLITUS WITH DIABETIC NEUROPATHY, WITHOUT LONG-TERM CURRENT USE OF INSULIN (HCC): ICD-10-CM

## 2019-02-06 DIAGNOSIS — I48.0 PAROXYSMAL ATRIAL FIBRILLATION (HCC): Chronic | ICD-10-CM

## 2019-02-06 DIAGNOSIS — E05.90 HYPERTHYROIDISM: ICD-10-CM

## 2019-02-06 DIAGNOSIS — J90 PLEURAL EFFUSION, LEFT: ICD-10-CM

## 2019-02-06 DIAGNOSIS — R63.4 ABNORMAL WEIGHT LOSS: Primary | ICD-10-CM

## 2019-02-06 PROCEDURE — 99214 OFFICE O/P EST MOD 30 MIN: CPT | Performed by: FAMILY MEDICINE

## 2019-02-06 NOTE — PROGRESS NOTES
Assessment/Plan:         Diagnoses and all orders for this visit:    Abnormal weight loss  Comments:  Still is losing, but no focal findings  Check prealbumin  Orders:  -     CBC and differential; Future  -     Prealbumin; Future    Paroxysmal atrial fibrillation (HCC)  Comments:  Stable, on anticoagulation    Pleural effusion, left  Comments:  Return to Pulmonologist Dr Torey Bahena, likely needs repeat CXR through her    Essential hypertension    Type 2 diabetes mellitus with diabetic neuropathy, without long-term current use of insulin ([facility] [address] )  Comments:  Stable DM, due for A1c  Orders:  -     Comprehensive metabolic panel; Future  -     HEMOGLOBIN A1C W/ EAG ESTIMATION; Future    Hyperthyroidism  -     TSH, 3rd generation with Free T4 reflex; Future          Subjective:      Patient ID: Peter Lopez is a 66 y o  male  2 week follow up  Still no appetite, gets full right away  No nausea or vomiting  He has lots of noise in his GI tract but has always had this  He stopped Escitalopram because it caused fatigue, but he is continuing with Lorazepam     He has increased the fats in his diet but is still careful  Glucometers are all low 100's, no hypoglycemia  He has lots of regular milk and dairy products    Will be continuing with Dr Torey Bahena for Pulmonary followup and will wait for a Cyril appt with  Pulmonary group- he doesn't want to travel up to [facility]            The following portions of the patient's history were reviewed and updated as appropriate: allergies, current medications, past family history, past medical history, past social history, past surgical history and problem list     Review of Systems   Constitutional: Positive for activity change, appetite change, fatigue and unexpected weight change  Negative for fever  HENT: Negative  Respiratory: Negative  Cardiovascular: Negative  Gastrointestinal: Negative  Genitourinary: Negative  Musculoskeletal: Negative      Hematological: Negative  Psychiatric/Behavioral: Negative  All other systems reviewed and are negative  Objective:      /70   Pulse 82   Temp (!) 96 5 °F (35 8 °C)   Ht 5' 8 5" (1 74 m)   Wt 75 7 kg (166 lb 12 8 oz)   BMI 24 99 kg/m²          Physical Exam   Constitutional: He is oriented to person, place, and time  He appears well-developed and well-nourished  HENT:   Head: Normocephalic and atraumatic  Cardiovascular: Normal rate  Murmur heard  Abdominal: Soft  Normal appearance and normal aorta  Bowel sounds are increased  There is no hepatosplenomegaly  There is no tenderness  No hernia  Musculoskeletal: He exhibits no edema  Neurological: He is oriented to person, place, and time  Psychiatric: He has a normal mood and affect  His behavior is normal  Judgment and thought content normal    Nursing note and vitals reviewed

## 2019-03-01 ENCOUNTER — APPOINTMENT (OUTPATIENT)
Dept: LAB | Facility: HOSPITAL | Age: 79
End: 2019-03-01
Payer: MEDICARE

## 2019-03-01 DIAGNOSIS — R63.4 ABNORMAL WEIGHT LOSS: ICD-10-CM

## 2019-03-01 DIAGNOSIS — E05.90 HYPERTHYROIDISM: ICD-10-CM

## 2019-03-01 DIAGNOSIS — E11.40 TYPE 2 DIABETES MELLITUS WITH DIABETIC NEUROPATHY, WITHOUT LONG-TERM CURRENT USE OF INSULIN (HCC): ICD-10-CM

## 2019-03-01 LAB
ALBUMIN SERPL BCP-MCNC: 3.5 G/DL (ref 3.5–5)
ALP SERPL-CCNC: 154 U/L (ref 46–116)
ALT SERPL W P-5'-P-CCNC: 18 U/L (ref 12–78)
ANION GAP SERPL CALCULATED.3IONS-SCNC: 8 MMOL/L (ref 4–13)
AST SERPL W P-5'-P-CCNC: 15 U/L (ref 5–45)
BASOPHILS # BLD AUTO: 0.05 THOUSANDS/ΜL (ref 0–0.1)
BASOPHILS NFR BLD AUTO: 1 % (ref 0–1)
BILIRUB SERPL-MCNC: 1.5 MG/DL (ref 0.2–1)
BUN SERPL-MCNC: 21 MG/DL (ref 5–25)
CALCIUM SERPL-MCNC: 9.1 MG/DL (ref 8.3–10.1)
CHLORIDE SERPL-SCNC: 98 MMOL/L (ref 100–108)
CO2 SERPL-SCNC: 30 MMOL/L (ref 21–32)
CREAT SERPL-MCNC: 1.21 MG/DL (ref 0.6–1.3)
EOSINOPHIL # BLD AUTO: 0.11 THOUSAND/ΜL (ref 0–0.61)
EOSINOPHIL NFR BLD AUTO: 2 % (ref 0–6)
ERYTHROCYTE [DISTWIDTH] IN BLOOD BY AUTOMATED COUNT: 14.2 % (ref 11.6–15.1)
EST. AVERAGE GLUCOSE BLD GHB EST-MCNC: 137 MG/DL
GFR SERPL CREATININE-BSD FRML MDRD: 57 ML/MIN/1.73SQ M
GLUCOSE SERPL-MCNC: 241 MG/DL (ref 65–140)
HBA1C MFR BLD: 6.4 % (ref 4.2–6.3)
HCT VFR BLD AUTO: 42.3 % (ref 36.5–49.3)
HGB BLD-MCNC: 13.8 G/DL (ref 12–17)
IMM GRANULOCYTES # BLD AUTO: 0.01 THOUSAND/UL (ref 0–0.2)
IMM GRANULOCYTES NFR BLD AUTO: 0 % (ref 0–2)
LYMPHOCYTES # BLD AUTO: 0.78 THOUSANDS/ΜL (ref 0.6–4.47)
LYMPHOCYTES NFR BLD AUTO: 12 % (ref 14–44)
MCH RBC QN AUTO: 31.7 PG (ref 26.8–34.3)
MCHC RBC AUTO-ENTMCNC: 32.6 G/DL (ref 31.4–37.4)
MCV RBC AUTO: 97 FL (ref 82–98)
MONOCYTES # BLD AUTO: 0.67 THOUSAND/ΜL (ref 0.17–1.22)
MONOCYTES NFR BLD AUTO: 10 % (ref 4–12)
NEUTROPHILS # BLD AUTO: 4.99 THOUSANDS/ΜL (ref 1.85–7.62)
NEUTS SEG NFR BLD AUTO: 75 % (ref 43–75)
NRBC BLD AUTO-RTO: 0 /100 WBCS
PLATELET # BLD AUTO: 273 THOUSANDS/UL (ref 149–390)
PMV BLD AUTO: 9.1 FL (ref 8.9–12.7)
POTASSIUM SERPL-SCNC: 4.3 MMOL/L (ref 3.5–5.3)
PREALB SERPL-MCNC: 19.7 MG/DL (ref 18–40)
PROT SERPL-MCNC: 7.9 G/DL (ref 6.4–8.2)
RBC # BLD AUTO: 4.36 MILLION/UL (ref 3.88–5.62)
SODIUM SERPL-SCNC: 136 MMOL/L (ref 136–145)
TSH SERPL DL<=0.05 MIU/L-ACNC: 0.67 UIU/ML (ref 0.36–3.74)
WBC # BLD AUTO: 6.61 THOUSAND/UL (ref 4.31–10.16)

## 2019-03-01 PROCEDURE — 84134 ASSAY OF PREALBUMIN: CPT

## 2019-03-01 PROCEDURE — 83036 HEMOGLOBIN GLYCOSYLATED A1C: CPT

## 2019-03-01 PROCEDURE — 80053 COMPREHEN METABOLIC PANEL: CPT

## 2019-03-01 PROCEDURE — 85025 COMPLETE CBC W/AUTO DIFF WBC: CPT

## 2019-03-01 PROCEDURE — 84443 ASSAY THYROID STIM HORMONE: CPT

## 2019-03-01 PROCEDURE — 36415 COLL VENOUS BLD VENIPUNCTURE: CPT

## 2019-03-04 ENCOUNTER — TELEPHONE (OUTPATIENT)
Dept: FAMILY MEDICINE CLINIC | Facility: HOSPITAL | Age: 79
End: 2019-03-04

## 2019-03-06 DIAGNOSIS — J90 PLEURAL EFFUSION, LEFT: Primary | ICD-10-CM

## 2019-03-07 ENCOUNTER — HOSPITAL ENCOUNTER (OUTPATIENT)
Dept: RADIOLOGY | Facility: HOSPITAL | Age: 79
Discharge: HOME | End: 2019-03-07
Payer: MEDICARE

## 2019-03-07 DIAGNOSIS — J90 PLEURAL EFFUSION, LEFT: ICD-10-CM

## 2019-03-07 PROCEDURE — 71046 X-RAY EXAM CHEST 2 VIEWS: CPT

## 2019-03-11 ENCOUNTER — OFFICE VISIT (OUTPATIENT)
Dept: PULMONOLOGY | Facility: CLINIC | Age: 79
End: 2019-03-11
Payer: MEDICARE

## 2019-03-11 VITALS
HEIGHT: 69 IN | RESPIRATION RATE: 17 BRPM | OXYGEN SATURATION: 97 % | HEART RATE: 86 BPM | BODY MASS INDEX: 25.03 KG/M2 | WEIGHT: 169 LBS | SYSTOLIC BLOOD PRESSURE: 100 MMHG | TEMPERATURE: 96.8 F | DIASTOLIC BLOOD PRESSURE: 58 MMHG

## 2019-03-11 DIAGNOSIS — R05.9 COUGH: ICD-10-CM

## 2019-03-11 DIAGNOSIS — I50.32 CHRONIC DIASTOLIC CONGESTIVE HEART FAILURE (HCC): ICD-10-CM

## 2019-03-11 DIAGNOSIS — J43.9 PULMONARY EMPHYSEMA, UNSPECIFIED EMPHYSEMA TYPE (HCC): ICD-10-CM

## 2019-03-11 DIAGNOSIS — J90 PLEURAL EFFUSION, LEFT: Primary | ICD-10-CM

## 2019-03-11 DIAGNOSIS — H40.9 GLAUCOMA OF BOTH EYES, UNSPECIFIED GLAUCOMA TYPE: ICD-10-CM

## 2019-03-11 DIAGNOSIS — I48.0 PAROXYSMAL ATRIAL FIBRILLATION (HCC): Chronic | ICD-10-CM

## 2019-03-11 DIAGNOSIS — I27.20 PULMONARY HYPERTENSION (HCC): ICD-10-CM

## 2019-03-11 DIAGNOSIS — R94.2 ABNORMAL PFTS: ICD-10-CM

## 2019-03-11 DIAGNOSIS — R63.4 ABNORMAL WEIGHT LOSS: ICD-10-CM

## 2019-03-11 PROCEDURE — 99205 OFFICE O/P NEW HI 60 MIN: CPT | Performed by: INTERNAL MEDICINE

## 2019-03-11 RX ORDER — ALBUTEROL SULFATE 90 UG/1
2 AEROSOL, METERED RESPIRATORY (INHALATION) EVERY 6 HOURS PRN
Qty: 1 EACH | Refills: 3 | Status: SHIPPED | OUTPATIENT
Start: 2019-03-11

## 2019-03-11 NOTE — ASSESSMENT & PLAN NOTE
Moderate size currently, asymptomatic or minimally symptomatic with minimal shortness of breath which is in part secondary to congestive heart failure and COPD  Had thoracenteses in 2018 in December that was transudative and lymphocytic predominant  As per patient was negative for malignant cells  Also remote thoracentesis from the right side in 2013 that was transudative and also negative for malignant cells as per patient  I believe patient has bilateral pleural effusions secondary to chronic diastolic CHF and he is currently stable on Lasix  I had long discussion with patient and his friend, will monitor symptoms for now and repeat chest x-ray in 6 months prior to the next visit  If he gets worsening shortness of breath at any time he can go to the emergency room or call for earlier appointment and imaging study then we can arrange for thoracentesis  He will continue diuretics and low-sodium diet as well  In the future if he continues to have recurrent large effusions requiring at least 2 thoracenteses in a short period of time then will consider tunneled Pleurx catheter although less likely  Patient and his friend verbalized understanding and they agree with the plan

## 2019-03-11 NOTE — ASSESSMENT & PLAN NOTE
Patient has obstructive air flow limitation secondary to emphysema/COPD  Also has restrictive airflow limitation most likely secondary to chronic diastolic CHF and bilateral pleural effusions  He is not very symptomatic and in the future if worse we can do complete PFTs to check lung volumes and DLCO

## 2019-03-11 NOTE — ASSESSMENT & PLAN NOTE
Most likely WHO group 2 mainly with CHF and probably group 3  Minimally symptomatic, Continue current management of CHF and COPD    In future if worse then will consider 6 min walk test for oxygen and also discussion with Cardiology for pulmonary hypertension treatment

## 2019-03-11 NOTE — ASSESSMENT & PLAN NOTE
Continue Lasix and follow with Cardiology  Continue Xarelto, lisinopril  We spoke about sodium restriction and avoiding salty food

## 2019-03-11 NOTE — LETTER
March 11, 2019     Maik Alberto MD  [facility]  [address] [address]    Patient: Irene Latham   YOB: 1940   Date of Visit: 3/11/2019       Dear Dr Kev Delgado: Thank you for referring Irene Latham to me for evaluation  Below are my notes for this consultation  If you have questions, please do not hesitate to call me  I look forward to following your patient along with you  Sincerely,        Cee Lux MD        CC: No Recipients  Cee Lux MD  3/11/2019  3:12 PM  Sign at close encounter    Consultation - Pulmonary Medicine   Irene Latham 66 y o  male MRN: [id number]        Physician Requesting Consult: Maik Alberto MD  Reason for Consult:  Pleural effusion    Pleural effusion, left  Moderate size currently, asymptomatic or minimally symptomatic with minimal shortness of breath which is in part secondary to congestive heart failure and COPD  Had thoracenteses in 2018 in December that was transudative and lymphocytic predominant  As per patient was negative for malignant cells  Also remote thoracentesis from the right side in 2013 that was transudative and also negative for malignant cells as per patient  I believe patient has bilateral pleural effusions secondary to chronic diastolic CHF and he is currently stable on Lasix  I had long discussion with patient and his friend, will monitor symptoms for now and repeat chest x-ray in 6 months prior to the next visit  If he gets worsening shortness of breath at any time he can go to the emergency room or call for earlier appointment and imaging study then we can arrange for thoracentesis  He will continue diuretics and low-sodium diet as well  In the future if he continues to have recurrent large effusions requiring at least 2 thoracenteses in a short period of time then will consider tunneled Pleurx catheter although less likely    Patient and his friend verbalized understanding and they agree with the plan  Pulmonary emphysema ([facility] [address] )  Will avoid LAMA given his severe glaucoma as he states unless Ophthalmology allows that in the future  For now will continue p r n  Albuterol with ProAir which I order to his pharmacy  And also I told him to continue his Advair HFA 2 puffs twice daily, he will call the office to tell us the exact dose he has at home  Pulmonary hypertension ([facility] )  Most likely WHO group 2 mainly with CHF and probably group 3  Minimally symptomatic, Continue current management of CHF and COPD  In future if worse then will consider 6 min walk test for oxygen and also discussion with Cardiology for pulmonary hypertension treatment    Paroxysmal atrial fibrillation ([facility] [address] )  Continue Xarelto and metoprolol    Congestive heart failure (HCC)  Continue Lasix and follow with Cardiology  Continue Xarelto, lisinopril  We spoke about sodium restriction and avoiding salty food  Abnormal weight loss  Stable currently as per patient, in the future if he continues to have weight loss he will need chest CT scan as workup for malignancy if not done since I can see that ordered by his primary care physician and I will wait for the results  Cough  Secondary to postnasal drips and possible emphysema, as per patient it is minimal complaint and not bothering him and he uses p r n  Nasal spray over-the-counter  Glaucoma  Severe as per patient, will avoid LAMA for now  Abnormal PFTs  Patient has obstructive air flow limitation secondary to emphysema/COPD  Also has restrictive airflow limitation most likely secondary to chronic diastolic CHF and bilateral pleural effusions  He is not very symptomatic and in the future if worse we can do complete PFTs to check lung volumes and DLCO         ______________________________________________________________________    HPI:    Chaz Clinton is a 66 y o  male who presents for evaluation of pleural effusion, shortness of breath and COPD    Patient is a former smoker, he smoked 1 pack per day for 35 years and quit in [place] [facility], he was diagnosed with emphysema/COPD and used to follow with pulmonology at [facility] until recently and his pulmonologist moved to a different facility  He was prescribed Advair HFA and p r n  Albuterol but he is not sure about dosage  He has dyspnea on exertion that improved recently since he was started on Lasix few months ago  He denies any orthopnea or chest pain or legs edema  He denies any wheezing  He denies any chronic cough except in the morning he has some clear mucus that he clears easily and he attributes to postnasal drips, he uses p r n  Over-the-counter nasal spray sometimes  He denies is severe exacerbation of his COPD that requires corticosteroids  Patient also has recurrent pleural effusion, he recalls right thoracentesis in 2013 and again left thoracentesis in December 2018  He was told at some point he may need tunneled PleurX catheter  He was told in both times that his pleural fluid was negative for malignant cells  Patient has chronic diastolic CHF and he was started on Lasix recently in the past few months and he feels much better on Lasix  He also has atrial fibrillation status post permanent pacemaker and also on Xarelto  Patient complained of weight loss over the past year or so and now he started to stabilize and has some weight gain as per her him and his friend, he attributed the weight loss to some stresses in his life last year  He is up-to-date with cancer screening including prostate and colonoscopy  Patient has BPH and he is currently on Flomax  Also he has severe glaucoma in both eyes and follows with Ophthalmology and he is on eyedrops  He currently lives with his friend  He quit smoking in 1990  Review of Systems:  Review of Systems   Constitutional: Positive for unexpected weight change  As HPI   HENT: Negative  Eyes: Negative      Respiratory:        As HPI Cardiovascular: Negative  Gastrointestinal: Negative  Endocrine: Negative  Genitourinary: Negative  Musculoskeletal: Negative  Skin: Negative  Allergic/Immunologic: Negative  Neurological: Negative  Hematological: Negative  Psychiatric/Behavioral: Negative            Historical Information   Past Medical History:   Diagnosis Date    Abnormal liver enzymes 2013    AF (atrial fibrillation) (HCC)     Anxiety     BPH (benign prostatic hyperplasia)     Cataract     Diabetes mellitus ([facility] )     Disease of thyroid gland     Glaucoma     Glaucoma, bilateral 2013    Hypertension     Insomnia     Pacemaker     2 5 yr ago    Paroxysmal atrial fibrillation ([facility] ) 2012    Pneumonia 2012    Primary open angle glaucoma 2012    Pulmonary HTN ([place] [address] )     Recurrent spontaneous pneumothorax 10/02/2013    Traumatic subarachnoid hemorrhage ([place] [address] ) 04/10/2017     Past Surgical History:   Procedure Laterality Date    ATRIAL CARDIAC PACEMAKER INSERTION      CATARACT EXTRACTION, BILATERAL      L - 3 months ago, R prior    CHOLECYSTECTOMY      COLONOSCOPY  10/15/2015    polyp in decending colon; angioectasias; follow up colo if clinically indicated    LAPAROSCOPIC CHOLECYSTECTOMY  2014    LUNG DECORTICATION Right     Partial Pulmonary Decortation    UMBILICAL HERNIA REPAIR       Social History   Social History     Tobacco Use   Smoking Status Former Smoker    Last attempt to quit:     Years since quittin 2   Smokeless Tobacco Never Used       Occupational history:  No occupational exposure    Family History:   Family History   Problem Relation Age of Onset    Cancer Mother     Alcohol abuse Mother     Liver cancer Mother     Substance Abuse Mother     Dementia Father     Diabetes Son     Clotting disorder Daughter         Coagulation factor disorder         PhysicalExamination:  Vitals:   /58 (BP Location: Left arm, Patient Position: Sitting, Cuff Size: Standard)   Pulse 86   Temp (!) 96 8 °F (36 °C) (Tympanic)   Resp 17   Ht 5' 8 5" (1 74 m)   Wt 76 7 kg (169 lb)   SpO2 97%   BMI 25 32 kg/m²      General: alert, not in acute distress  HEENT: PERRL, no icteric sclera or cyanosis, no thrush  Neck:  Supple, no lymphadenopathy or thyromegaly, no JVD  Lungs:  Decreased breath sounds at bases left more than right, no crackles or wheezing  Heart: S1S2 regular, 2/6 SM  Abdomen: soft, non-tender, bowel sounds  present  Extrimities: no edema, no clubbing or cyanosis  Neuro: Alert and oriented x 3, no focal neurodeficits   Skin: intact, no rashes      Diagnostic Data:  Labs: I personally reviewed the most recent laboratory data pertinent to today's visit    Lab Results   Component Value Date    WBC 6 61 03/01/2019    HGB 13 8 03/01/2019    HCT 42 3 03/01/2019    MCV 97 03/01/2019     03/01/2019     Lab Results   Component Value Date    GLUCOSE 175 (H) 09/27/2013    CALCIUM 9 1 03/01/2019     09/27/2013    K 4 3 03/01/2019    CO2 30 03/01/2019    CL 98 (L) 03/01/2019    BUN 21 03/01/2019    CREATININE 1 21 03/01/2019     No results found for: IGE  Lab Results   Component Value Date    ALT 18 03/01/2019    AST 15 03/01/2019    ALKPHOS 154 (H) 03/01/2019    BILITOT 1 1 09/27/2013     Body fluid from 10/1/2013 (most likely pleural fluid):  Glucose 141, , protein 3 3 consistent with transudative fluid most likely      PFT results: The most recent pulmonary function tests were reviewed  Spirometry from September 2018 with combined moderate obstructive and restrictive airflow limitation  No significant bronchodilator response      Pre-Bronch Post-Bronch  Actual Pred %Pred Actual %Pred %Chng  ---- SPIROMETRY ----  FVC (L) *2 61 3 94 *66 *2 68 *68 +2  FEV1 (L) *1 76 2 81 *62 *1 88 *66 +7  FEV1/FVC (%) 67 72 93 70 97 +4  FEF 25% (L/sec) *2 79 6 37 *43 *3 26 *51 +16  FEF 75% (L/sec) 0 53 0 88 60 0 55 62 +3  FEF 25-75% (L/sec) 1 13 1 97 57 1 27 64 +12  FEF Max (L/sec) *3 94 7 23 *54 *3 97 *54 +0  FIVC (L) 2 18 2 10 -3  FIF Max (L/sec) 3 03 2 60 -14  ---- LUNG VOLUMES ----  SVC (L) *2 52 3 94 *64  IC (L) 1 48 3 16 46  ERV (L) 1 04 0 78 133  Imaging:  I personally reviewed the images on the [facility] system pertinent to today's visit  Chest x-ray reviewed on PACs:  Left moderate pleural effusion, right small pleural effusion, clear lung otherwise    CXR report from December 2018:      Chest CT report from Sep 2018:        Other studies:  Echocardiogram 2017:  LVEF 55-60%, mildly dilated RV, moderately dilated LA/RA, mild MR, mild AR, estimated peak PA pressure 55    Echo 12/2018 outside:      Left thoracentesis pleural fluid 12/7/18  PH 8 0  Glucose 123    Protein 3 0  WBC 1623, 96% lymphocytes  RBC 09858  amylase 20      José Antonio Chatman MD

## 2019-03-11 NOTE — ASSESSMENT & PLAN NOTE
Secondary to postnasal drips and possible emphysema, as per patient it is minimal complaint and not bothering him and he uses p r n  Nasal spray over-the-counter

## 2019-03-11 NOTE — ASSESSMENT & PLAN NOTE
Stable currently as per patient, in the future if he continues to have weight loss he will need chest CT scan as workup for malignancy if not done since I can see that ordered by his primary care physician and I will wait for the results

## 2019-03-12 ENCOUNTER — TELEPHONE (OUTPATIENT)
Dept: PULMONOLOGY | Facility: CLINIC | Age: 79
End: 2019-03-12

## 2019-03-12 NOTE — TELEPHONE ENCOUNTER
Called and left a message that the inhaler he is supposed to give the information on is the [place]  I asked the patient to call me back asap with the correct information    Patient called and said that he is not on advair at all   He was confused and that he is on ventolin 90

## 2019-04-13 DIAGNOSIS — N40.0 BENIGN PROSTATIC HYPERPLASIA, UNSPECIFIED WHETHER LOWER URINARY TRACT SYMPTOMS PRESENT: ICD-10-CM

## 2019-04-13 RX ORDER — TAMSULOSIN HYDROCHLORIDE 0.4 MG/1
CAPSULE ORAL
Qty: 90 CAPSULE | Refills: 2 | Status: SHIPPED | OUTPATIENT
Start: 2019-04-13 | End: 2019-04-16 | Stop reason: SDUPTHER

## 2019-04-16 ENCOUNTER — OFFICE VISIT (OUTPATIENT)
Dept: FAMILY MEDICINE CLINIC | Facility: HOSPITAL | Age: 79
End: 2019-04-16
Payer: MEDICARE

## 2019-04-16 VITALS
WEIGHT: 175 LBS | BODY MASS INDEX: 25.92 KG/M2 | HEIGHT: 69 IN | TEMPERATURE: 96.5 F | DIASTOLIC BLOOD PRESSURE: 60 MMHG | SYSTOLIC BLOOD PRESSURE: 90 MMHG | HEART RATE: 80 BPM

## 2019-04-16 DIAGNOSIS — N40.0 BENIGN PROSTATIC HYPERPLASIA, UNSPECIFIED WHETHER LOWER URINARY TRACT SYMPTOMS PRESENT: ICD-10-CM

## 2019-04-16 DIAGNOSIS — J43.9 PULMONARY EMPHYSEMA, UNSPECIFIED EMPHYSEMA TYPE (HCC): ICD-10-CM

## 2019-04-16 DIAGNOSIS — I27.20 PULMONARY HYPERTENSION (HCC): ICD-10-CM

## 2019-04-16 DIAGNOSIS — E11.40 TYPE 2 DIABETES MELLITUS WITH DIABETIC NEUROPATHY, WITHOUT LONG-TERM CURRENT USE OF INSULIN (HCC): ICD-10-CM

## 2019-04-16 DIAGNOSIS — F41.9 ANXIETY: ICD-10-CM

## 2019-04-16 DIAGNOSIS — F51.01 PRIMARY INSOMNIA: ICD-10-CM

## 2019-04-16 DIAGNOSIS — I10 ESSENTIAL HYPERTENSION: Primary | ICD-10-CM

## 2019-04-16 PROCEDURE — 99215 OFFICE O/P EST HI 40 MIN: CPT | Performed by: FAMILY MEDICINE

## 2019-04-16 RX ORDER — ALBUTEROL SULFATE 90 UG/1
2 AEROSOL, METERED RESPIRATORY (INHALATION) EVERY 6 HOURS PRN
COMMUNITY

## 2019-04-16 RX ORDER — TAMSULOSIN HYDROCHLORIDE 0.4 MG/1
0.8 CAPSULE ORAL
Qty: 180 CAPSULE | Refills: 2 | Status: SHIPPED | OUTPATIENT
Start: 2019-04-16

## 2019-04-16 RX ORDER — TRAZODONE HYDROCHLORIDE 50 MG/1
50 TABLET ORAL
Qty: 30 TABLET | Refills: 1 | Status: SHIPPED | OUTPATIENT
Start: 2019-04-16 | End: 2019-05-09 | Stop reason: SDUPTHER

## 2019-04-16 RX ORDER — LORAZEPAM 1 MG/1
1 TABLET ORAL
Qty: 30 TABLET | Refills: 0 | Status: SHIPPED | OUTPATIENT
Start: 2019-04-16

## 2019-05-09 DIAGNOSIS — F51.01 PRIMARY INSOMNIA: ICD-10-CM

## 2019-05-09 RX ORDER — TRAZODONE HYDROCHLORIDE 50 MG/1
TABLET ORAL
Qty: 30 TABLET | Refills: 0 | Status: SHIPPED | OUTPATIENT
Start: 2019-05-09 | End: 2019-06-04 | Stop reason: SDUPTHER

## 2019-06-04 DIAGNOSIS — F51.01 PRIMARY INSOMNIA: ICD-10-CM

## 2019-06-04 RX ORDER — TRAZODONE HYDROCHLORIDE 50 MG/1
TABLET ORAL
Qty: 30 TABLET | Refills: 2 | Status: SHIPPED | OUTPATIENT
Start: 2019-06-04

## 2019-07-08 ENCOUNTER — TELEPHONE (OUTPATIENT)
Dept: FAMILY MEDICINE CLINIC | Facility: HOSPITAL | Age: 79
End: 2019-07-08

## 2019-07-08 DIAGNOSIS — J30.0 VASOMOTOR RHINITIS: Primary | ICD-10-CM

## 2019-07-08 RX ORDER — IPRATROPIUM BROMIDE 42 UG/1
2 SPRAY, METERED NASAL 3 TIMES DAILY
Qty: 15 ML | Refills: 3 | Status: SHIPPED | OUTPATIENT
Start: 2019-07-08

## 2019-07-08 NOTE — TELEPHONE ENCOUNTER
pts ENT doctor will not refill his ipratrotium bromide nasal solution   06% because he hasnt been seen in over 6 months but he now lives in [facility] and wont be going to them anymore and hasnt been able to find a new ent doc yet  He needs this med asap and was hoping Dr Gilma Pierre would be able to [facility] [address] [place], would we be able to do this?

## 2019-08-05 ENCOUNTER — TELEPHONE (OUTPATIENT)
Dept: FAMILY MEDICINE CLINIC | Facility: HOSPITAL | Age: 79
End: 2019-08-05

## 2019-08-05 NOTE — TELEPHONE ENCOUNTER
Pt is only able to get into town from [place] last minute and randomly, he wants to reschedule his Lorelei John f/u that was cancelled before and wanted to come in this Wednesday afternoon  He has nothing, just a same day apt  I told him its not available but I would ask  If Lorelei Lopez would be willing to put him in that same day slot   If not he wont be able to reschedule until a later date

## 2019-08-05 NOTE — TELEPHONE ENCOUNTER
Those same days are really not open slots  He is scheduled with Dr Dell Ivy 9/9/19  Can he see me at 1 pm that day?

## 2019-08-05 NOTE — TELEPHONE ENCOUNTER
He was going to find out if he can get a ride, however, I noticed the appt w/ Dr Ward Zimmerman is actually the same time that day @ 1pm

## 2019-10-30 DIAGNOSIS — J30.0 VASOMOTOR RHINITIS: ICD-10-CM

## 2019-10-31 RX ORDER — IPRATROPIUM BROMIDE 42 UG/1
2 SPRAY, METERED NASAL 3 TIMES DAILY
Refills: 3 | OUTPATIENT
Start: 2019-10-31

## 2019-11-13 DIAGNOSIS — J30.0 VASOMOTOR RHINITIS: ICD-10-CM

## 2019-11-13 RX ORDER — IPRATROPIUM BROMIDE 42 UG/1
2 SPRAY, METERED NASAL 3 TIMES DAILY
Refills: 3 | OUTPATIENT
Start: 2019-11-13

## 2019-12-29 DIAGNOSIS — E11.40 CONTROLLED TYPE 2 DIABETES MELLITUS WITH DIABETIC NEUROPATHY, WITHOUT LONG-TERM CURRENT USE OF INSULIN (HCC): ICD-10-CM

## 2019-12-29 RX ORDER — GLIMEPIRIDE 2 MG/1
TABLET ORAL
Qty: 60 TABLET | Refills: 13 | OUTPATIENT
Start: 2019-12-29

## 2020-04-02 DIAGNOSIS — N40.0 BENIGN PROSTATIC HYPERPLASIA, UNSPECIFIED WHETHER LOWER URINARY TRACT SYMPTOMS PRESENT: ICD-10-CM

## 2020-04-02 RX ORDER — TAMSULOSIN HYDROCHLORIDE 0.4 MG/1
CAPSULE ORAL
Qty: 60 CAPSULE | Refills: 8 | OUTPATIENT
Start: 2020-04-02

## 2020-04-06 DIAGNOSIS — R94.2 ABNORMAL PFTS: Primary | ICD-10-CM

## 2020-04-06 RX ORDER — ALBUTEROL SULFATE 90 UG/1
AEROSOL, METERED RESPIRATORY (INHALATION)
Qty: 18 INHALER | Refills: 2 | Status: SHIPPED | OUTPATIENT
Start: 2020-04-06

## 2024-04-16 NOTE — PROGRESS NOTES
Consultation - Pulmonary Medicine   Yvon See 66 y o  male MRN: [id number]        Physician Requesting Consult: Reid Delvalle MD  Reason for Consult:  Pleural effusion    Pleural effusion, left  Moderate size currently, asymptomatic or minimally symptomatic with minimal shortness of breath which is in part secondary to congestive heart failure and COPD  Had thoracenteses in 2018 in December that was transudative and lymphocytic predominant  As per patient was negative for malignant cells  Also remote thoracentesis from the right side in 2013 that was transudative and also negative for malignant cells as per patient  I believe patient has bilateral pleural effusions secondary to chronic diastolic CHF and he is currently stable on Lasix  I had long discussion with patient and his friend, will monitor symptoms for now and repeat chest x-ray in 6 months prior to the next visit  If he gets worsening shortness of breath at any time he can go to the emergency room or call for earlier appointment and imaging study then we can arrange for thoracentesis  He will continue diuretics and low-sodium diet as well  In the future if he continues to have recurrent large effusions requiring at least 2 thoracenteses in a short period of time then will consider tunneled Pleurx catheter although less likely  Patient and his friend verbalized understanding and they agree with the plan  Pulmonary emphysema ([address] )  Will avoid LAMA given his severe glaucoma as he states unless Ophthalmology allows that in the future  For now will continue p r n  Albuterol with ProAir which I order to his pharmacy  And also I told him to continue his Advair HFA 2 puffs twice daily, he will call the office to tell us the exact dose he has at home  Pulmonary hypertension ([address] )  Most likely WHO group 2 mainly with CHF and probably group 3  Minimally symptomatic, Continue current management of CHF and COPD    In future if worse then will consider 6 min walk test for oxygen and also discussion with Cardiology for pulmonary hypertension treatment    Paroxysmal atrial fibrillation ([address] )  Continue Xarelto and metoprolol    Congestive heart failure (HCC)  Continue Lasix and follow with Cardiology  Continue Xarelto, lisinopril  We spoke about sodium restriction and avoiding salty food  Abnormal weight loss  Stable currently as per patient, in the future if he continues to have weight loss he will need chest CT scan as workup for malignancy if not done since I can see that ordered by his primary care physician and I will wait for the results  Cough  Secondary to postnasal drips and possible emphysema, as per patient it is minimal complaint and not bothering him and he uses p r n  Nasal spray over-the-counter  Glaucoma  Severe as per patient, will avoid LAMA for now  Abnormal PFTs  Patient has obstructive air flow limitation secondary to emphysema/COPD  Also has restrictive airflow limitation most likely secondary to chronic diastolic CHF and bilateral pleural effusions  He is not very symptomatic and in the future if worse we can do complete PFTs to check lung volumes and DLCO  Patient requested to have his future follow-ups at [facility]  ______________________________________________________________________    HPI:    Dulce Varela is a 66 y o  male who presents for evaluation of pleural effusion, shortness of breath and COPD  Patient is a former smoker, he smoked 1 pack per day for 35 years and quit in [address] [facility] [facility], he was diagnosed with emphysema/COPD and used to follow with pulmonology at [facility] until recently and his pulmonologist moved to a different facility  He was prescribed Advair HFA and p r n  Albuterol but he is not sure about dosage  He has dyspnea on exertion that improved recently since he was started on Lasix few months ago  He denies any orthopnea or chest pain or legs edema    He denies any wheezing  He denies any chronic cough except in the morning he has some clear mucus that he clears easily and he attributes to postnasal drips, he uses p r n  Over-the-counter nasal spray sometimes  He denies is severe exacerbation of his COPD that requires corticosteroids  Patient also has recurrent pleural effusion, he recalls right thoracentesis in 2013 and again left thoracentesis in December 2018  He was told at some point he may need tunneled PleurX catheter  He was told in both times that his pleural fluid was negative for malignant cells  Patient has chronic diastolic CHF and he was started on Lasix recently in the past few months and he feels much better on Lasix  He also has atrial fibrillation status post permanent pacemaker and also on Xarelto  Patient complained of weight loss over the past year or so and now he started to stabilize and has some weight gain as per her him and his friend, he attributed the weight loss to some stresses in his life last year  He is up-to-date with cancer screening including prostate and colonoscopy  Patient has BPH and he is currently on Flomax  Also he has severe glaucoma in both eyes and follows with Ophthalmology and he is on eyedrops  He currently lives with his friend  He quit smoking in 1990  Review of Systems:  Review of Systems   Constitutional: Positive for unexpected weight change  As HPI   HENT: Negative  Eyes: Negative  Respiratory:        As HPI   Cardiovascular: Negative  Gastrointestinal: Negative  Endocrine: Negative  Genitourinary: Negative  Musculoskeletal: Negative  Skin: Negative  Allergic/Immunologic: Negative  Neurological: Negative  Hematological: Negative  Psychiatric/Behavioral: Negative            Historical Information   Past Medical History:   Diagnosis Date    Abnormal liver enzymes 02/18/2013    AF (atrial fibrillation) (HCC)     Anxiety     BPH (benign prostatic hyperplasia)  Cataract     Diabetes mellitus ([facility] )     Disease of thyroid gland     Glaucoma     Glaucoma, bilateral 2013    Hypertension     Insomnia     Pacemaker     2 5 yr ago    Paroxysmal atrial fibrillation ([facility] ) 2012    Pneumonia 2012    Primary open angle glaucoma 2012    Pulmonary HTN ([place] [address] )     Recurrent spontaneous pneumothorax 10/02/2013    Traumatic subarachnoid hemorrhage ([place] [address] ) 04/10/2017     Past Surgical History:   Procedure Laterality Date    ATRIAL CARDIAC PACEMAKER INSERTION      CATARACT EXTRACTION, BILATERAL      L - 3 months ago, R prior    CHOLECYSTECTOMY      COLONOSCOPY  10/15/2015    polyp in decending colon; angioectasias; follow up colo if clinically indicated    LAPAROSCOPIC CHOLECYSTECTOMY  2014    LUNG DECORTICATION Right     Partial Pulmonary Decortation    UMBILICAL HERNIA REPAIR       Social History   Social History     Tobacco Use   Smoking Status Former Smoker    Last attempt to quit: Augie Castellanos Years since quittin 2   Smokeless Tobacco Never Used       Occupational history:  No occupational exposure    Family History:   Family History   Problem Relation Age of Onset    Cancer Mother     Alcohol abuse Mother     Liver cancer Mother     Substance Abuse Mother     Dementia Father     Diabetes Son     Clotting disorder Daughter         Coagulation factor disorder         PhysicalExamination:  Vitals:   /58 (BP Location: Left arm, Patient Position: Sitting, Cuff Size: Standard)   Pulse 86   Temp (!) 96 8 °F (36 °C) (Tympanic)   Resp 17   Ht 5' 8 5" (1 74 m)   Wt 76 7 kg (169 lb)   SpO2 97%   BMI 25 32 kg/m²     General: alert, not in acute distress  HEENT: PERRL, no icteric sclera or cyanosis, no thrush  Neck:  Supple, no lymphadenopathy or thyromegaly, no JVD  Lungs:  Decreased breath sounds at bases left more than right, no crackles or wheezing  Heart: S1S2 regular, 2/6 SM  Abdomen: soft, non-tender, bowel sounds present  Extrimities: no edema, no clubbing or cyanosis  Neuro: Alert and oriented x 3, no focal neurodeficits   Skin: intact, no rashes      Diagnostic Data:  Labs: I personally reviewed the most recent laboratory data pertinent to today's visit    Lab Results   Component Value Date    WBC 6 61 03/01/2019    HGB 13 8 03/01/2019    HCT 42 3 03/01/2019    MCV 97 03/01/2019     03/01/2019     Lab Results   Component Value Date    GLUCOSE 175 (H) 09/27/2013    CALCIUM 9 1 03/01/2019     09/27/2013    K 4 3 03/01/2019    CO2 30 03/01/2019    CL 98 (L) 03/01/2019    BUN 21 03/01/2019    CREATININE 1 21 03/01/2019     No results found for: IGE  Lab Results   Component Value Date    ALT 18 03/01/2019    AST 15 03/01/2019    ALKPHOS 154 (H) 03/01/2019    BILITOT 1 1 09/27/2013     Body fluid from 10/1/2013 (most likely pleural fluid):  Glucose 141, , protein 3 3 consistent with transudative fluid most likely      PFT results: The most recent pulmonary function tests were reviewed  Spirometry from September 2018 with combined moderate obstructive and restrictive airflow limitation  No significant bronchodilator response      Pre-Bronch Post-Bronch  Actual Pred %Pred Actual %Pred %Chng  ---- SPIROMETRY ----  FVC (L) *2 61 3 94 *66 *2 68 *68 +2  FEV1 (L) *1 76 2 81 *62 *1 88 *66 +7  FEV1/FVC (%) 67 72 93 70 97 +4  FEF 25% (L/sec) *2 79 6 37 *43 *3 26 *51 +16  FEF 75% (L/sec) 0 53 0 88 60 0 55 62 +3  FEF 25-75% (L/sec) 1 13 1 97 57 1 27 64 +12  FEF Max (L/sec) *3 94 7 23 *54 *3 97 *54 +0  FIVC (L) 2 18 2 10 -3  FIF Max (L/sec) 3 03 2 60 -14  ---- LUNG VOLUMES ----  SVC (L) *2 52 3 94 *64  IC (L) 1 48 3 16 46  ERV (L) 1 04 0 78 133  Imaging:  I personally reviewed the images on the [facility] system pertinent to today's visit  Chest x-ray reviewed on PACs:  Left moderate pleural effusion, right small pleural effusion, clear lung otherwise    CXR report from December 2018:      Chest CT report from Sep 2018: Other studies:  Echocardiogram 2017:  LVEF 55-60%, mildly dilated RV, moderately dilated LA/RA, mild MR, mild AR, estimated peak PA pressure 55    Echo 12/2018 outside:      Left thoracentesis pleural fluid 12/7/18  PH 8 0  Glucose 123    Protein 3 0  WBC 1623, 96% lymphocytes  RBC 62069  amylase 20      Ace Sandoval MD No abnormal movements

## 2024-07-11 NOTE — ASSESSMENT & PLAN NOTE
Patient returning call from nurse   Will avoid LAMA given his severe glaucoma as he states unless Ophthalmology allows that in the future  For now will continue p r n  Albuterol with ProAir which I order to his pharmacy  And also I told him to continue his Advair HFA 2 puffs twice daily, he will call the office to tell us the exact dose he has at home